# Patient Record
Sex: FEMALE | Race: WHITE | NOT HISPANIC OR LATINO | Employment: FULL TIME | URBAN - METROPOLITAN AREA
[De-identification: names, ages, dates, MRNs, and addresses within clinical notes are randomized per-mention and may not be internally consistent; named-entity substitution may affect disease eponyms.]

---

## 2017-01-30 ENCOUNTER — OFFICE VISIT (OUTPATIENT)
Dept: ORTHOPEDICS | Facility: CLINIC | Age: 42
End: 2017-01-30
Payer: COMMERCIAL

## 2017-01-30 ENCOUNTER — HOSPITAL ENCOUNTER (OUTPATIENT)
Dept: RADIOLOGY | Facility: HOSPITAL | Age: 42
Discharge: HOME | End: 2017-01-30
Attending: ORTHOPAEDIC SURGERY
Payer: COMMERCIAL

## 2017-01-30 VITALS
DIASTOLIC BLOOD PRESSURE: 86 MMHG | SYSTOLIC BLOOD PRESSURE: 161 MMHG | HEART RATE: 75 BPM | WEIGHT: 293 LBS | HEIGHT: 68 IN | BODY MASS INDEX: 44.41 KG/M2

## 2017-01-30 DIAGNOSIS — L03.116 CELLULITIS OF LEFT LOWER EXTREMITY: ICD-10-CM

## 2017-01-30 DIAGNOSIS — M25.569 KNEE PAIN, UNSPECIFIED CHRONICITY, UNSPECIFIED LATERALITY: ICD-10-CM

## 2017-01-30 DIAGNOSIS — M17.11 ARTHRITIS OF KNEE, RIGHT: Primary | ICD-10-CM

## 2017-01-30 DIAGNOSIS — M25.569 KNEE PAIN, UNSPECIFIED CHRONICITY, UNSPECIFIED LATERALITY: Primary | ICD-10-CM

## 2017-01-30 PROCEDURE — 99203 OFFICE O/P NEW LOW 30 MIN: CPT | Mod: 25,S$GLB,, | Performed by: ORTHOPAEDIC SURGERY

## 2017-01-30 PROCEDURE — 73564 X-RAY EXAM KNEE 4 OR MORE: CPT | Mod: TC,PN,RT

## 2017-01-30 PROCEDURE — 73562 X-RAY EXAM OF KNEE 3: CPT | Mod: 26,59,LT, | Performed by: RADIOLOGY

## 2017-01-30 PROCEDURE — 73564 X-RAY EXAM KNEE 4 OR MORE: CPT | Mod: 26,RT,, | Performed by: RADIOLOGY

## 2017-01-30 PROCEDURE — 20610 DRAIN/INJ JOINT/BURSA W/O US: CPT | Mod: RT,S$GLB,, | Performed by: ORTHOPAEDIC SURGERY

## 2017-01-30 PROCEDURE — 99999 PR PBB SHADOW E&M-EST. PATIENT-LVL III: CPT | Mod: PBBFAC,,, | Performed by: ORTHOPAEDIC SURGERY

## 2017-01-30 RX ORDER — TRIAMCINOLONE ACETONIDE 40 MG/ML
40 INJECTION, SUSPENSION INTRA-ARTICULAR; INTRAMUSCULAR
Status: DISCONTINUED | OUTPATIENT
Start: 2017-01-30 | End: 2017-01-30 | Stop reason: HOSPADM

## 2017-01-30 RX ORDER — IBUPROFEN 800 MG/1
TABLET ORAL
Qty: 60 TABLET | Refills: 1 | Status: SHIPPED | OUTPATIENT
Start: 2017-01-30 | End: 2017-03-29 | Stop reason: SDUPTHER

## 2017-01-30 RX ADMIN — TRIAMCINOLONE ACETONIDE 40 MG: 40 INJECTION, SUSPENSION INTRA-ARTICULAR; INTRAMUSCULAR at 02:01

## 2017-01-30 NOTE — PROGRESS NOTES
Past Medical History   Diagnosis Date    Bell's palsy      x4 attacks    Blood glucose abnormal     Dyslipidemia     Vitamin D deficiency disease        Past Surgical History   Procedure Laterality Date    Cholecystectomy       section, low transverse       x 2    Tubal ligation      Wound debridement       breast abscess x 3    Breast surgery Left 2008     abcess debrided on breast X 3 surgery       Current Outpatient Prescriptions   Medication Sig    albiglutide (TANZEUM) 30 mg/0.5 mL PnIj Inject 30 mg into the skin every 7 days.    ciclopirox (LOPROX) 0.77 % Crea Apply topically 2 (two) times daily.    ciclopirox (PENLAC) 8 % Soln Apply topically nightly.    ibuprofen (ADVIL,MOTRIN) 800 MG tablet TAKE 1 TABLET BY MOUTH 3 TIMES A DAY AS NEEDED FOR PAIN AND TAKE WITH FOOD    norethindrone-ethinyl estradiol (JUNEL FE , ,) 1 mg-20 mcg (21)/75 mg (7) per tablet Take 1 tablet by mouth once daily.    spironolactone (ALDACTONE) 50 MG tablet Take 1 tablet (50 mg total) by mouth once daily.    urea (UMECTA) 40 % Emul Apply 1 application topically 2 (two) times daily.    loratadine (CLARITIN) 10 mg tablet Take 1 tablet (10 mg total) by mouth once daily.     No current facility-administered medications for this visit.        Review of patient's allergies indicates:   Allergen Reactions    Codeine Nausea Only       Family History   Problem Relation Age of Onset    Asthma Father     Heart failure Father     Hypertension Mother     Hypertension Maternal Grandmother     Cancer Paternal Grandfather      lung    Rashes / Skin problems Son     Rashes / Skin problems Daughter     Glaucoma Neg Hx     Macular degeneration Neg Hx     Retinal detachment Neg Hx     Collagen disease Neg Hx        Social History     Social History    Marital status:      Spouse name: N/A    Number of children: 2    Years of education: N/A     Occupational History     [employer]      Social History Main Topics    Smoking status: Never Smoker    Smokeless tobacco: Never Used    Alcohol use Yes      Comment: 2 glasses of wine a month    Drug use: No    Sexual activity: Yes     Partners: Male     Birth control/ protection: Surgical     Other Topics Concern    Not on file     Social History Narrative       Chief Complaint:   Chief Complaint   Patient presents with    Knee Pain     right       History of present illness: 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  Patient's had pain now for about 3-4 months.  Her knee started locking up on her about a week ago.  Pain with sitting for prolonged periods of time.  Pain when walking or getting up from sitting.  Pain is located both medially and laterally and below the kneecap.  Patient denies swelling.  Pain as a 6 out of 10.  Symptoms are worsening and moderate to severe.  Taking some ibuprofen with minimal success.      Review of Systems:    Constitution: Negative for chills, fever, and sweats.  Negative for unexplained weight loss.    HENT:  Negative for headaches and blurry vision.    Cardiovascular:Negative for chest pain or irregular heart beat. Negative for hypertension.    Respiratory:  Negative for cough and shortness of breath.    Gastrointestinal: Negative for abdominal pain, heartburn, melena, nausea, and vomitting.    Genitourinary:  Negative bladder incontinence and dysuria.    Musculoskeletal:  See HPI    Neurological: Negative for numbness.    Psychiatric/Behavioral: Negative for depression.  The patient is not nervous/anxious.      Endocrine: Negative for polyuria    Hematologic/Lymphatic: Negative for bleeding problem.  Does  bruise/bleed easily.    Skin: Negative for poor would healing and rash      Physical Examination:    Vital Signs:    Vitals:    01/30/17 1329   BP: (!) 161/86   Pulse: 75       Body mass index is 52.15 kg/(m^2).    This a well-developed, well nourished patient  in no acute distress.  They are alert and oriented and cooperative to examination.  Pt. walks without an antalgic gait.      Examination of the right knee shows no rashes or erythema. There are no masses ecchymosis or effusion. Patient has full range of motion from 0-130°. Patient is mildly tender to palpation over lateral joint line and nontender to palpation over the medial joint line. Patient has a - Lachman exam, - anterior drawer exam, and - posterior drawer exam. - Emory's exam. Knee is stable to varus and valgus stress. 5 out of 5 motor strength. Palpable distal pulses. Intact light touch sensation. Negative Patellofemoral crepitus    Examination of the left knee shows no rashes or erythema. There are no masses ecchymosis or effusion. Patient has full range of motion from 0-130°. Patient is nontender to palpation over lateral joint line and nontender to palpation over the medial joint line. Patient has a - Lachman exam, - anterior drawer exam, and - posterior drawer exam. - Emory's exam. Knee is stable to varus and valgus stress. 5 out of 5 motor strength. Palpable distal pulses. Intact light touch sensation. Negative Patellofemoral crepitus    X-rays: X-rays of the right knee are ordered and reviewed which show some very mild arthritic changes.  Some spurring underneath the patella.  Mild calcification within the lateral meniscus.     Assessment:: Mild right knee arthritis    Plan:  I reviewed the findings and the x-rays with her  today.  We talked about exercise program and weight loss.  The patient cortisone injection today to help with some of the acute symptoms.  I gave her prescription for ibuprofen 800 milligrams 3 times a day.  Follow-up as needed.    This note was created using Dragon voice recognition software that occasionally misinterpreted phrases or words.    Consult note is delivered via Epic messaging service.

## 2017-01-30 NOTE — PROCEDURES
Large Joint Aspiration/Injection  Date/Time: 1/30/2017 2:13 PM  Performed by: KIM TYLER  Authorized by: KIM TYLER     Consent Done?:  Yes (Verbal)  Indications:  Pain  Procedure site marked: Yes    Timeout: Prior to procedure the correct patient, procedure, and site was verified      Location:  Knee  Site:  R knee  Prep: Patient was prepped and draped in usual sterile fashion    Needle size:  20 G  Approach:  Anterolateral  Medications:  40 mg triamcinolone acetonide 40 mg/mL  Patient tolerance:  Patient tolerated the procedure well with no immediate complications

## 2017-03-28 ENCOUNTER — LAB VISIT (OUTPATIENT)
Dept: LAB | Facility: HOSPITAL | Age: 42
End: 2017-03-28
Attending: FAMILY MEDICINE
Payer: COMMERCIAL

## 2017-03-28 DIAGNOSIS — R63.5 ABNORMAL WEIGHT GAIN: ICD-10-CM

## 2017-03-28 LAB
ALBUMIN SERPL BCP-MCNC: 3.3 G/DL
ALP SERPL-CCNC: 65 U/L
ALT SERPL W/O P-5'-P-CCNC: 31 U/L
ANION GAP SERPL CALC-SCNC: 7 MMOL/L
AST SERPL-CCNC: 24 U/L
BILIRUB SERPL-MCNC: 0.5 MG/DL
BUN SERPL-MCNC: 14 MG/DL
CALCIUM SERPL-MCNC: 9.1 MG/DL
CHLORIDE SERPL-SCNC: 104 MMOL/L
CO2 SERPL-SCNC: 30 MMOL/L
CREAT SERPL-MCNC: 0.8 MG/DL
EST. GFR  (AFRICAN AMERICAN): >60 ML/MIN/1.73 M^2
EST. GFR  (NON AFRICAN AMERICAN): >60 ML/MIN/1.73 M^2
GLUCOSE SERPL-MCNC: 81 MG/DL
POTASSIUM SERPL-SCNC: 4.1 MMOL/L
PROT SERPL-MCNC: 7.2 G/DL
SODIUM SERPL-SCNC: 141 MMOL/L

## 2017-03-28 PROCEDURE — 80053 COMPREHEN METABOLIC PANEL: CPT

## 2017-03-28 PROCEDURE — 36415 COLL VENOUS BLD VENIPUNCTURE: CPT | Mod: PO

## 2017-03-28 PROCEDURE — 83036 HEMOGLOBIN GLYCOSYLATED A1C: CPT

## 2017-03-28 RX ORDER — NORETHINDRONE ACETATE AND ETHINYL ESTRADIOL 1MG-20(21)
1 KIT ORAL DAILY
Qty: 28 TABLET | Refills: 0 | Status: SHIPPED | OUTPATIENT
Start: 2017-03-28 | End: 2017-04-21 | Stop reason: SDUPTHER

## 2017-03-29 DIAGNOSIS — L03.116 CELLULITIS OF LEFT LOWER EXTREMITY: ICD-10-CM

## 2017-03-29 LAB
ESTIMATED AVG GLUCOSE: 103 MG/DL
HBA1C MFR BLD HPLC: 5.2 %

## 2017-03-29 RX ORDER — IBUPROFEN 800 MG/1
TABLET ORAL
Qty: 60 TABLET | Refills: 1 | Status: SHIPPED | OUTPATIENT
Start: 2017-03-29 | End: 2017-04-05 | Stop reason: ALTCHOICE

## 2017-04-03 ENCOUNTER — TELEPHONE (OUTPATIENT)
Dept: FAMILY MEDICINE | Facility: CLINIC | Age: 42
End: 2017-04-03

## 2017-04-03 NOTE — TELEPHONE ENCOUNTER
Patient scheduled for 8:40am appt on Wednesday. I do not start clinic until 9am. Please reschedule patient.

## 2017-04-04 ENCOUNTER — DOCUMENTATION ONLY (OUTPATIENT)
Dept: FAMILY MEDICINE | Facility: CLINIC | Age: 42
End: 2017-04-04

## 2017-04-04 NOTE — PROGRESS NOTES
Pre-Visit Chart Review  For Appointment Scheduled on 4/5/17    Health Maintenance Due   Topic Date Due    TETANUS VACCINE  01/01/2017

## 2017-04-04 NOTE — PATIENT INSTRUCTIONS
Established High Blood Pressure    High blood pressure (hypertension) is a chronic disease. Often health care providers dont know what causes it. But it can be caused by certain health conditions and medicines.  If you have high blood pressure, you may not have any symptoms. If you do have symptoms, they may include headache, dizziness, changes in your vision, chest pain, and shortness of breath. But even without symptoms, high blood pressure thats not treated raises your risk for heart attack and stroke. High blood pressure is a serious health risk and shouldnt be ignored.  A blood pressure reading is made up of two numbers: a higher number over a lower number. The top number is the systolic pressure. The bottom number is the diastolic pressure. A normal blood pressure is less than 120 over less than 80.  High blood pressure is when either the top number is 140 or higher, or the bottom number is 90 or higher. This must be the result when taking your blood pressure a number of times. The blood pressures between normal and high are called prehypertension.  Home care  If you have high blood pressure, you should do what is listed below to lower your blood pressure. If you are taking medicines for high blood pressure, these methods may reduce or end your need for medicines in the future.  · Begin a weight-loss program if you are overweight.  · Cut back on how much salt you get in your diet. Heres how to do this:  ¨ Dont eat foods that have a lot of salt. These include olives, pickles, smoked meats, and salted potato chips.  ¨ Dont add salt to your food at the table.  ¨ Use only small amounts of salt when cooking.  · Begin an exercise program. Talk with your health care provider about the type of exercise program that would be best for you. It doesn't have to be hard. Even brisk walking for 20 minutes 3 times a week is a good form of exercise.  · Dont take medicines that have heart stimulants. This includes many  cold and sinus decongestant pills and sprays, as well as diet pills. Check the warnings about hypertension on the label. Stimulants such as amphetamine or cocaine could be lethal for someone with high blood pressure. Never take these.  · Limit how much caffeine you get in your diet. Switch to caffeine-free products.  · Stop smoking. If you are a long-time smoker, this can be hard. Enroll in a stop-smoking program to make it more likely that you will quit for good.  · Learn how to handle stress. This is an important part of any program to lower blood pressure. Learn about relaxation methods like meditation, yoga, or biofeedback.  · If your provider prescribed medicines, take them exactly as directed. Missing doses may cause your blood pressure get out of control.  · Consider buying an automatic blood pressure machine. You can get one of these at most pharmacies. Use this to watch your blood pressure at home. Give the results to your provider.  Follow-up care  You will need to make regular visits to your health care provider. This is to check your blood pressure and to make changes to your medicines. Make a follow-up appointment as directed.  When to seek medical advice  Call your health care provider right away if any of these occur:  · Chest pain or shortness of breath  · Severe headache  · Throbbing or rushing sound in the ears  · Nosebleed  · Sudden severe pain in your belly (abdomen)  · Extreme drowsiness, confusion, or fainting  · Dizziness or dizziness with a spinning sensation (vertigo)  · Weakness of an arm or leg or one side of the face  · You have problems speaking or seeing   Date Last Reviewed: 11/25/2014  © 3207-5794 [employer]. [address] [place] [address], [place] [address]. All rights reserved. This information is not intended as a substitute for professional medical care. Always follow your healthcare professional's instructions.          Walking for Fitness  Fitness walking has something  for everyone, even people who are already fit. Walking is one of the safest ways to condition your body aerobically. It can boost energy, help you lose weight, and reduce stress.    Physical benefits  · Walking strengthens your heart and lungs, and tones your muscles.  · When walking, your feet land with less impact than in other sports. This reduces chances of muscle, bone, and joint injury.  · Regular walking improves your cholesterol levels and lowers your risk of heart disease. And it helps you control your blood sugar if you have diabetes.  · Walking is a weight-bearing activity, which helps maintain bone density. This can help prevent osteoporosis.  Personal rewards  · Taking walks can help you relax and manage stress. And fitness walking may make you feel better about yourself.  · Walking can help you sleep better at night and make you less likely to be depressed.  · Regular walking may help maintain your memory as you get older.  · Walking is a great way to spend extra time with friends and family members. Be sure to invite your dog along!  Q&A about fitness walking  Q: Will walking keep me fit?  A: Yes. Regular walking at the right pace gives you all the benefits of other aerobic activities, such as jogging and swimming.  Q: Will walking help me lose weight and keep it off?  A: Yes. Per mile, walking can burn as many calories as jogging. Your health care provider can help work walking into your weight-loss plan.  Q: Is walking safe for my health?  A: Yes. Walking is safe if you have high blood pressure, diabetes, heart disease, or other conditions. Talk to your health care provider before you start.  Date Last Reviewed: 5/9/2015 © 2000-2016 [employer]. [address] [place] [address], [place] [address]. All rights reserved. This information is not intended as a substitute for professional medical care. Always follow your healthcare professional's instructions.            Weight Management: Getting  Started  Healthy bodies come in all shapes and sizes. Not all bodies are made to be thin. For some people, a healthy weight is higher than the average weight listed on weight charts. Your healthcare provider can help you decide on a healthy weight for you.    Reasons to lose weight  Losing weight can help with some health problems, such as high blood pressure, heart disease, diabetes, sleep apnea, and arthritis. You may also feel more energy.  Set your long-term goal  Your goal doesn't even have to be a specific weight. You may decide on a fitness goal (such as being able to walk 10 miles a week), or a health goal (such as lowering your blood pressure). Choose a goal that is measurable and reasonable, so you know when you've reached it. A goal of reaching a BMI of less than 25 is not always reasonable (or possible).   Make an action plan  Habits dont change overnight. Setting your goals too high can leave you feeling discouraged if you cant reach them. Be realistic. Choose one or two small changes you can make now. Set an action plan for how you are going to make these changes. When you can stick to this plan, keep making a few more small changes. Taking small steps will help you stay on the path to success.  Track your progress  Write down your goals. Then, keep a daily record of your progress. Write down what you eat and how active you are. This record lets you look back on how much youve done. It may also help when youre feeling frustrated. Reward yourself for success. Even if you dont reach every goal, give yourself credit for what you do get done.  Get support  Encouragement from others can help make losing weight easier. Ask your family members and friends for support. They may even want to join you. Also look to your healthcare provider, registered dietitian, and  for help. Your local hospital can give you more information about nutrition, exercise, and weight loss.  Date Last  Reviewed: 1/31/2016  © 5873-2750 [employer]. [address] [place] [address], [place] [address]. All rights reserved. This information is not intended as a substitute for professional medical care. Always follow your healthcare professional's instructions.

## 2017-04-05 ENCOUNTER — OFFICE VISIT (OUTPATIENT)
Dept: FAMILY MEDICINE | Facility: CLINIC | Age: 42
End: 2017-04-05
Payer: COMMERCIAL

## 2017-04-05 ENCOUNTER — LAB VISIT (OUTPATIENT)
Dept: LAB | Facility: HOSPITAL | Age: 42
End: 2017-04-05
Attending: FAMILY MEDICINE
Payer: COMMERCIAL

## 2017-04-05 VITALS
RESPIRATION RATE: 14 BRPM | WEIGHT: 293 LBS | BODY MASS INDEX: 44.41 KG/M2 | HEART RATE: 70 BPM | OXYGEN SATURATION: 99 % | DIASTOLIC BLOOD PRESSURE: 77 MMHG | SYSTOLIC BLOOD PRESSURE: 134 MMHG | HEIGHT: 68 IN

## 2017-04-05 DIAGNOSIS — I10 ESSENTIAL HYPERTENSION: ICD-10-CM

## 2017-04-05 DIAGNOSIS — R73.09 BLOOD GLUCOSE ABNORMAL: ICD-10-CM

## 2017-04-05 DIAGNOSIS — E55.9 VITAMIN D DEFICIENCY DISEASE: ICD-10-CM

## 2017-04-05 DIAGNOSIS — M25.50 ARTHRALGIA, UNSPECIFIED JOINT: ICD-10-CM

## 2017-04-05 DIAGNOSIS — Z23 NEED FOR TDAP VACCINATION: ICD-10-CM

## 2017-04-05 DIAGNOSIS — E28.2 POLYCYSTIC OVARIAN SYNDROME: Primary | Chronic | ICD-10-CM

## 2017-04-05 LAB — 25(OH)D3+25(OH)D2 SERPL-MCNC: 24 NG/ML

## 2017-04-05 PROCEDURE — 99999 PR PBB SHADOW E&M-EST. PATIENT-LVL III: CPT | Mod: PBBFAC,,, | Performed by: PHYSICIAN ASSISTANT

## 2017-04-05 PROCEDURE — 90471 IMMUNIZATION ADMIN: CPT | Mod: S$GLB,,, | Performed by: PHYSICIAN ASSISTANT

## 2017-04-05 PROCEDURE — 90715 TDAP VACCINE 7 YRS/> IM: CPT | Mod: S$GLB,,, | Performed by: PHYSICIAN ASSISTANT

## 2017-04-05 PROCEDURE — 82306 VITAMIN D 25 HYDROXY: CPT

## 2017-04-05 PROCEDURE — 36415 COLL VENOUS BLD VENIPUNCTURE: CPT | Mod: PO

## 2017-04-05 PROCEDURE — 99213 OFFICE O/P EST LOW 20 MIN: CPT | Mod: 25,S$GLB,, | Performed by: PHYSICIAN ASSISTANT

## 2017-04-05 RX ORDER — MELOXICAM 7.5 MG/1
7.5 TABLET ORAL DAILY
Qty: 30 TABLET | Refills: 3 | Status: SHIPPED | OUTPATIENT
Start: 2017-04-05 | End: 2017-08-15 | Stop reason: SDUPTHER

## 2017-04-05 NOTE — MR AVS SNAPSHOT
[facility]  [address]  [place] [address]  Phone: [phone]  Fax: [phone]                  Candie Krause   2017 11:20 AM   Office Visit    Description:  Female : 1975   Provider:  Hiral Monzon PA-C   Department:  [facility]           Reason for Visit     Follow-up           Diagnoses this Visit        Comments    Polycystic ovarian syndrome    -  Primary     Blood glucose abnormal         Essential hypertension         Arthralgia, unspecified joint         Need for Tdap vaccination         Vitamin D deficiency disease                To Do List           Future Appointments        Provider Department Dept Phone    2017 2:30 PM LAB, [facility] GARS[facility] - Lab [phone]    2017 11:00 AM Poncho Banks MD [facility] [phone]      Goals (5 Years of Data)     None       These Medications        Disp Refills Start End    albiglutide (TANZEUM) 30 mg/0.5 mL PnIj 4 each 3 2017     Inject 30 mg into the skin every 7 days. - Subcutaneous    Pharmacy: [facility]/pharmacy #[id number] - [place] - [address] Ph #: [phone]       meloxicam (MOBIC) 7.5 MG tablet 30 tablet 3 2017     Take 1 tablet (7.5 mg total) by mouth once daily. - Oral    Pharmacy: [facility]/pharmacy #[id number] - [place] - [address] Ph #: [phone]         [facility]     [facility] - 24/ Assistance  Unless otherwise directed by your provider, please contact [facility], our nurse care line that is available for 24/7 assistance.     Registered nurses in the [facility] provide: appointment scheduling, clinical advisement, health education, and other advisory services.  Call: [phone] (toll free)               Medications           Message regarding Medications     Verify the changes and/or additions to your medication regime listed below are the same as discussed with your clinician  "today.  If any of these changes or additions are incorrect, please notify your healthcare provider.        START taking these NEW medications        Refills    meloxicam (MOBIC) 7.5 MG tablet 3    Sig: Take 1 tablet (7.5 mg total) by mouth once daily.    Class: Normal    Route: Oral      STOP taking these medications     ibuprofen (ADVIL,MOTRIN) 800 MG tablet TAKE 1 TABLET BY MOUTH 3 TIMES A DAY AS NEEDED FOR PAIN WITH FOOD           Verify that the below list of medications is an accurate representation of the medications you are currently taking.  If none reported, the list may be blank. If incorrect, please contact your healthcare provider. Carry this list with you in case of emergency.           Current Medications     albiglutide (TANZEUM) 30 mg/0.5 mL PnIj Inject 30 mg into the skin every 7 days.    ciclopirox (LOPROX) 0.77 % Crea Apply topically 2 (two) times daily.    ciclopirox (PENLAC) 8 % Soln Apply topically nightly.    loratadine (CLARITIN) 10 mg tablet Take 1 tablet (10 mg total) by mouth once daily.    meloxicam (MOBIC) 7.5 MG tablet Take 1 tablet (7.5 mg total) by mouth once daily.    norethindrone-ethinyl estradiol (JUNEL FE 1/20, 28,) 1 mg-20 mcg (21)/75 mg (7) per tablet Take 1 tablet by mouth once daily.    spironolactone (ALDACTONE) 50 MG tablet Take 1 tablet (50 mg total) by mouth once daily.    urea (UMECTA) 40 % Emul Apply 1 application topically 2 (two) times daily.           Clinical Reference Information           Your Vitals Were     BP Pulse Resp Height Weight SpO2    134/77 (BP Location: Right arm, Patient Position: Sitting, BP Method: Automatic) 70 14 5' 8" (1.727 m) 159.2 kg (350 lb 15.6 oz) 99%    BMI                53.37 kg/m2          Blood Pressure          Most Recent Value    BP  134/77      Allergies as of 4/5/2017     Codeine      Immunizations Administered on Date of Encounter - 4/5/2017     Name Date Dose VIS Date Route    TDAP  Incomplete 0.5 mL 2/24/2015 Intramuscular    "   Orders Placed During Today's Visit      Normal Orders This Visit    Tdap Vaccine     Future Labs/Procedures Expected by Expires    Vitamin D  4/5/2017 4/6/2018      Instructions      Established High Blood Pressure    High blood pressure (hypertension) is a chronic disease. Often health care providers dont know what causes it. But it can be caused by certain health conditions and medicines.  If you have high blood pressure, you may not have any symptoms. If you do have symptoms, they may include headache, dizziness, changes in your vision, chest pain, and shortness of breath. But even without symptoms, high blood pressure thats not treated raises your risk for heart attack and stroke. High blood pressure is a serious health risk and shouldnt be ignored.  A blood pressure reading is made up of two numbers: a higher number over a lower number. The top number is the systolic pressure. The bottom number is the diastolic pressure. A normal blood pressure is less than 120 over less than 80.  High blood pressure is when either the top number is 140 or higher, or the bottom number is 90 or higher. This must be the result when taking your blood pressure a number of times. The blood pressures between normal and high are called prehypertension.  Home care  If you have high blood pressure, you should do what is listed below to lower your blood pressure. If you are taking medicines for high blood pressure, these methods may reduce or end your need for medicines in the future.  · Begin a weight-loss program if you are overweight.  · Cut back on how much salt you get in your diet. Heres how to do this:  ¨ Dont eat foods that have a lot of salt. These include olives, pickles, smoked meats, and salted potato chips.  ¨ Dont add salt to your food at the table.  ¨ Use only small amounts of salt when cooking.  · Begin an exercise program. Talk with your health care provider about the type of exercise program that would be best  for you. It doesn't have to be hard. Even brisk walking for 20 minutes 3 times a week is a good form of exercise.  · Dont take medicines that have heart stimulants. This includes many cold and sinus decongestant pills and sprays, as well as diet pills. Check the warnings about hypertension on the label. Stimulants such as amphetamine or cocaine could be lethal for someone with high blood pressure. Never take these.  · Limit how much caffeine you get in your diet. Switch to caffeine-free products.  · Stop smoking. If you are a long-time smoker, this can be hard. Enroll in a stop-smoking program to make it more likely that you will quit for good.  · Learn how to handle stress. This is an important part of any program to lower blood pressure. Learn about relaxation methods like meditation, yoga, or biofeedback.  · If your provider prescribed medicines, take them exactly as directed. Missing doses may cause your blood pressure get out of control.  · Consider buying an automatic blood pressure machine. You can get one of these at most pharmacies. Use this to watch your blood pressure at home. Give the results to your provider.  Follow-up care  You will need to make regular visits to your health care provider. This is to check your blood pressure and to make changes to your medicines. Make a follow-up appointment as directed.  When to seek medical advice  Call your health care provider right away if any of these occur:  · Chest pain or shortness of breath  · Severe headache  · Throbbing or rushing sound in the ears  · Nosebleed  · Sudden severe pain in your belly (abdomen)  · Extreme drowsiness, confusion, or fainting  · Dizziness or dizziness with a spinning sensation (vertigo)  · Weakness of an arm or leg or one side of the face  · You have problems speaking or seeing   Date Last Reviewed: 11/25/2014  © 0570-0240 [employer]. [address] [place] [address], [place] [address]. All rights reserved. This information  is not intended as a substitute for professional medical care. Always follow your healthcare professional's instructions.          Walking for Fitness  Fitness walking has something for everyone, even people who are already fit. Walking is one of the safest ways to condition your body aerobically. It can boost energy, help you lose weight, and reduce stress.    Physical benefits  · Walking strengthens your heart and lungs, and tones your muscles.  · When walking, your feet land with less impact than in other sports. This reduces chances of muscle, bone, and joint injury.  · Regular walking improves your cholesterol levels and lowers your risk of heart disease. And it helps you control your blood sugar if you have diabetes.  · Walking is a weight-bearing activity, which helps maintain bone density. This can help prevent osteoporosis.  Personal rewards  · Taking walks can help you relax and manage stress. And fitness walking may make you feel better about yourself.  · Walking can help you sleep better at night and make you less likely to be depressed.  · Regular walking may help maintain your memory as you get older.  · Walking is a great way to spend extra time with friends and family members. Be sure to invite your dog along!  Q&A about fitness walking  Q: Will walking keep me fit?  A: Yes. Regular walking at the right pace gives you all the benefits of other aerobic activities, such as jogging and swimming.  Q: Will walking help me lose weight and keep it off?  A: Yes. Per mile, walking can burn as many calories as jogging. Your health care provider can help work walking into your weight-loss plan.  Q: Is walking safe for my health?  A: Yes. Walking is safe if you have high blood pressure, diabetes, heart disease, or other conditions. Talk to your health care provider before you start.  Date Last Reviewed: 5/9/2015  © 1040-6095 [employer]. [address] [place] [address], [place] [address]. All rights  reserved. This information is not intended as a substitute for professional medical care. Always follow your healthcare professional's instructions.            Weight Management: Getting Started  Healthy bodies come in all shapes and sizes. Not all bodies are made to be thin. For some people, a healthy weight is higher than the average weight listed on weight charts. Your healthcare provider can help you decide on a healthy weight for you.    Reasons to lose weight  Losing weight can help with some health problems, such as high blood pressure, heart disease, diabetes, sleep apnea, and arthritis. You may also feel more energy.  Set your long-term goal  Your goal doesn't even have to be a specific weight. You may decide on a fitness goal (such as being able to walk 10 miles a week), or a health goal (such as lowering your blood pressure). Choose a goal that is measurable and reasonable, so you know when you've reached it. A goal of reaching a BMI of less than 25 is not always reasonable (or possible).   Make an action plan  Habits dont change overnight. Setting your goals too high can leave you feeling discouraged if you cant reach them. Be realistic. Choose one or two small changes you can make now. Set an action plan for how you are going to make these changes. When you can stick to this plan, keep making a few more small changes. Taking small steps will help you stay on the path to success.  Track your progress  Write down your goals. Then, keep a daily record of your progress. Write down what you eat and how active you are. This record lets you look back on how much youve done. It may also help when youre feeling frustrated. Reward yourself for success. Even if you dont reach every goal, give yourself credit for what you do get done.  Get support  Encouragement from others can help make losing weight easier. Ask your family members and friends for support. They may even want to join you. Also look to your  healthcare provider, registered dietitian, and  for help. Your local hospital can give you more information about nutrition, exercise, and weight loss.  Date Last Reviewed: 1/31/2016  © 1736-0620 [employer]. [address] [place] [address], [place] [address]. All rights reserved. This information is not intended as a substitute for professional medical care. Always follow your healthcare professional's instructions.                 Language Assistance Services     ATTENTION: Language assistance services are available, free of charge. Please call [phone].      ATENCIÓN: Si habla español, tiene a vu disposición servicios gratuitos de asistencia lingüística. Llame al [phone].     INA Ý: N?u b?n nói Ti?ng Vi?t, có các d?ch v? h? tr? ngôn ng? mi?n phí dành cho b?n. G?i s? [phone].         [facility] complies with applicable Federal civil rights laws and does not discriminate on the basis of race, color, national origin, age, disability, or sex.

## 2017-04-05 NOTE — PROGRESS NOTES
Patient identified using name and . VIS given to patient and procedure was explained. Patient verbalized understanding. TDAP administered IM in the right deltoid using sterile technique. No residual bleeding noted. Patient tolerated procedure well.

## 2017-04-05 NOTE — PROGRESS NOTES
Subjective:       Patient ID: Candie Krause is a 41 y.o. female.    Chief Complaint: Follow-up (F/U medication)    HPI Comments: Ms. Krause comes to clinic today for follow up. She requests medication refills. She reports that the tanzeum was originally refilled under a diagnosis that is not paid for. She also requests a refill of mobic. The patient recently had blood work that was stable. She is due to have her vitamin D level checked at this time. She is also due for a TDAP vaccine. The patient has no complaints today and reports that overall she is doing well.     Review of Systems   Constitutional: Negative for activity change, appetite change and fever.   HENT: Negative for postnasal drip, rhinorrhea and sinus pressure.    Eyes: Negative for visual disturbance.   Respiratory: Negative for cough and shortness of breath.    Cardiovascular: Negative for chest pain.   Gastrointestinal: Negative for abdominal distention and abdominal pain.   Genitourinary: Negative for difficulty urinating and dysuria.   Musculoskeletal: Positive for arthralgias. Negative for myalgias.   Neurological: Negative for headaches.   Hematological: Negative for adenopathy.   Psychiatric/Behavioral: The patient is not nervous/anxious.        Objective:      Physical Exam   Constitutional: She is oriented to person, place, and time.   Cardiovascular: Normal rate and regular rhythm.    Pulmonary/Chest: Effort normal and breath sounds normal. She has no wheezes.   Abdominal: Soft. Bowel sounds are normal. There is no tenderness.   Musculoskeletal: She exhibits no edema.   Neurological: She is alert and oriented to person, place, and time.   Skin: No erythema.   Psychiatric: Her behavior is normal.       Assessment:       1. Polycystic ovarian syndrome    2. Blood glucose abnormal    3. Essential hypertension    4. Arthralgia, unspecified joint    5. Need for Tdap vaccination    6. Vitamin D deficiency disease        Plan:       Candie  was seen today for follow-up.    Diagnoses and all orders for this visit:    Polycystic ovarian syndrome  -     albiglutide (TANZEUM) 30 mg/0.5 mL PnIj; Inject 30 mg into the skin every 7 days.    Blood glucose abnormal  -     albiglutide (TANZEUM) 30 mg/0.5 mL PnIj; Inject 30 mg into the skin every 7 days.    Essential hypertension  Controlled  Low salt diet  Continue current medication  Arthralgia, unspecified joint  -     meloxicam (MOBIC) 7.5 MG tablet; Take 1 tablet (7.5 mg total) by mouth once daily.  Patient advised to take medication with food and only take as needed as long term use of NSAID can lead to GI bleeding. Patient verbalized understanding.  Need for Tdap vaccination  -     Tdap Vaccine    Vitamin D deficiency disease  -     Vitamin D; Future

## 2017-04-06 DIAGNOSIS — E55.9 VITAMIN D DEFICIENCY: Primary | ICD-10-CM

## 2017-04-06 RX ORDER — ERGOCALCIFEROL 1.25 MG/1
50000 CAPSULE ORAL WEEKLY
Qty: 4 CAPSULE | Refills: 5 | Status: SHIPPED | OUTPATIENT
Start: 2017-04-06 | End: 2017-07-21 | Stop reason: SDUPTHER

## 2017-04-10 ENCOUNTER — PATIENT MESSAGE (OUTPATIENT)
Dept: FAMILY MEDICINE | Facility: CLINIC | Age: 42
End: 2017-04-10

## 2017-04-10 DIAGNOSIS — R73.9 HYPERGLYCEMIA: Primary | ICD-10-CM

## 2017-04-10 DIAGNOSIS — E28.2 PCOS (POLYCYSTIC OVARIAN SYNDROME): ICD-10-CM

## 2017-04-21 RX ORDER — NORETHINDRONE ACETATE AND ETHINYL ESTRADIOL 1MG-20(21)
KIT ORAL
Qty: 28 TABLET | Refills: 0 | Status: SHIPPED | OUTPATIENT
Start: 2017-04-21 | End: 2017-08-18 | Stop reason: SDUPTHER

## 2017-06-09 ENCOUNTER — TELEPHONE (OUTPATIENT)
Dept: OPTOMETRY | Facility: CLINIC | Age: 42
End: 2017-06-09

## 2017-06-09 NOTE — TELEPHONE ENCOUNTER
Recvd request to order contacts. Called and left mesg with pt that the contact lens rx we have on file is from  and . Advised we need new rx or set up an appt for routine eye exam with contact lens fitting.    Saleem Diaz, COA  [facility]

## 2017-07-20 ENCOUNTER — DOCUMENTATION ONLY (OUTPATIENT)
Dept: FAMILY MEDICINE | Facility: CLINIC | Age: 42
End: 2017-07-20

## 2017-07-20 NOTE — PROGRESS NOTES
Pre-Visit Chart Review  For Appointment Scheduled on 07/21/2017    Health Maintenance Due   Topic Date Due    Mammogram  08/26/2017

## 2017-07-21 ENCOUNTER — OFFICE VISIT (OUTPATIENT)
Dept: FAMILY MEDICINE | Facility: CLINIC | Age: 42
End: 2017-07-21
Payer: COMMERCIAL

## 2017-07-21 ENCOUNTER — HOSPITAL ENCOUNTER (OUTPATIENT)
Dept: RADIOLOGY | Facility: CLINIC | Age: 42
Discharge: HOME | End: 2017-07-21
Attending: FAMILY MEDICINE
Payer: COMMERCIAL

## 2017-07-21 VITALS
DIASTOLIC BLOOD PRESSURE: 60 MMHG | HEART RATE: 66 BPM | BODY MASS INDEX: 43.4 KG/M2 | TEMPERATURE: 98 F | WEIGHT: 293 LBS | HEIGHT: 69 IN | SYSTOLIC BLOOD PRESSURE: 132 MMHG

## 2017-07-21 DIAGNOSIS — G56.01 CARPAL TUNNEL SYNDROME OF RIGHT WRIST: Chronic | ICD-10-CM

## 2017-07-21 DIAGNOSIS — M47.22 OSTEOARTHRITIS OF SPINE WITH RADICULOPATHY, CERVICAL REGION: ICD-10-CM

## 2017-07-21 DIAGNOSIS — I10 ESSENTIAL HYPERTENSION: Primary | ICD-10-CM

## 2017-07-21 DIAGNOSIS — E55.9 VITAMIN D DEFICIENCY: ICD-10-CM

## 2017-07-21 PROCEDURE — 72040 X-RAY EXAM NECK SPINE 2-3 VW: CPT | Mod: TC,PO

## 2017-07-21 PROCEDURE — 99999 PR PBB SHADOW E&M-EST. PATIENT-LVL III: CPT | Mod: PBBFAC,,, | Performed by: FAMILY MEDICINE

## 2017-07-21 PROCEDURE — 99213 OFFICE O/P EST LOW 20 MIN: CPT | Mod: S$GLB,,, | Performed by: FAMILY MEDICINE

## 2017-07-21 PROCEDURE — 72040 X-RAY EXAM NECK SPINE 2-3 VW: CPT | Mod: 26,,, | Performed by: RADIOLOGY

## 2017-07-21 RX ORDER — GABAPENTIN 300 MG/1
300 CAPSULE ORAL NIGHTLY
Qty: 30 CAPSULE | Refills: 3 | Status: SHIPPED | OUTPATIENT
Start: 2017-07-21 | End: 2017-12-27 | Stop reason: SDUPTHER

## 2017-07-21 RX ORDER — ERGOCALCIFEROL 1.25 MG/1
50000 CAPSULE ORAL
Qty: 8 CAPSULE | Refills: 5 | Status: SHIPPED | OUTPATIENT
Start: 2017-07-24 | End: 2018-02-17 | Stop reason: SDUPTHER

## 2017-07-21 NOTE — PATIENT INSTRUCTIONS
Back Basics: A Healthy Spine  A healthy spine supports the body while letting it move freely. It does this with the help of three natural curves. Strong, flexible muscles help, too. They support the spine by keeping its curves properly aligned. The disks that cushion the bones of your spine also play a role in back fitness.    Three natural curves  The spine is made of bones (vertebrae) and pads of soft tissue (disks). These parts are arranged in three curves: cervical, thoracic, and lumbar. When properly aligned, these curves keep your body balanced. They also support your body when you move. By distributing your weight throughout your spine, the curves make back injuries less likely.  Strong, flexible muscles  Strong, flexible back muscles help support the three curves of the spine. They do so by holding the vertebrae and disks in proper alignment. Strong, flexible abdominal, hip, and leg muscles also reduce strain on the back.  The lumbar curve  The lumbar curve is the hardest-working part of the spine. It carries more weight and moves the most. Aligning this curve helps prevent damage to vertebrae, disks, and other parts of the spine.  Cushioning disks  Disks are the soft pads of tissue between the vertebrae. The disks absorb shock caused by movement. Each disk has a spongy center (nucleus) and a tougher outer ring (annulus). Movement within the nucleus allows the vertebrae to rock back and forth on the disks. This provides the flexibility needed to bend and move.       Date Last Reviewed: 10/18/2015  © 1300-1134 [employer]. [address] [place] [address], [place] [address]. All rights reserved. This information is not intended as a substitute for professional medical care. Always follow your healthcare professional's instructions.        Back Care Tips    Caring for your back  These are things you can do to prevent a recurrence of acute back pain and to reduce symptoms from chronic back  pain:  · Maintain a healthy weight. If you are overweight, losing weight will help most types of back pain.  · Exercise is an important part of recovery from most types of back pain. The muscles behind and in front of the spine support the back. This means strengthening both the back muscles and the abdominal muscles will provide better support for your spine.   · Swimming and brisk walking are good overall exercises to improve your fitness level.  · Practice safe lifting methods (below).  · Practice good posture when sitting, standing and walking. Avoid prolonged sitting. This puts more stress on the lower back than standing or walking.  · Wear quality shoes with sufficient arch support. Foot and ankle alignment can affect back symptoms. Women should avoid wearing high heels.  · Therapeutic massage can help relax the back muscles without stretching them.  · During the first 24 to 72 hours after an acute injury or flare-up of chronic back pain, apply an ice pack to the painful area for 20 minutes and then remove it for 20 minutes, over a period of 60 to 90 minutes, or several times a day. As a safety precaution, do not use a heating pad at bedtime. Sleeping on a heating pad can lead to skin burns or tissue damage.  · You can alternate ice and heat therapies.  Medications  Talk to your healthcare provider before using medicines, especially if you have other medical problems or are taking other medicines.  · You may use acetaminophen or ibuprofen to control pain, unless your healthcare provider prescribed other pain medicine. If you have chronic conditions like diabetes, liver or kidney disease, stomach ulcers, or gastrointestinal bleeding, or are taking blood thinners, talk with your healthcare provider before taking any medicines.  · Be careful if you are given prescription pain medicines, narcotics, or medicine for muscle spasm. They can cause drowsiness, affect your coordination, reflexes, and judgment. Do not  drive or operate heavy machinery while taking these types of medicines. Take prescription pain medicine only as prescribed by your healthcare provider.  Lumbar stretch  Here is a simple stretching exercise that will help relax muscle spasm and keep your back more limber. If exercise makes your back pain worse, dont do it.  · Lie on your back with your knees bent and both feet on the ground.  · Slowly raise your left knee to your chest as you flatten your lower back against the floor. Hold for 5 seconds.  · Relax and repeat the exercise with your right knee.  · Do 10 of these exercises for each leg.  Safe lifting method  · Dont bend over at the waist to lift an object off the floor.  Instead, bend your knees and hips in a squat.   · Keep your back and head upright  · Hold the object close to your body, directly in front of you.  · Straighten your legs to lift the object.   · Lower the object to the floor in the reverse fashion.  · If you must slide something across the floor, push it.  Posture tips  Sitting  Sit in chairs with straight backs or low-back support. Keep your knees lower than your hips, with your feet flat on the floor.  When driving, sit up straight. Adjust the seat forward so you are not leaning toward the steering wheel.  A small pillow or rolled towel behind your lower back may help if you are driving long distances.   Standing  When standing for long periods, shift most of your weight to one leg at a time. Alternate legs every few minutes.   Sleeping  The best way to sleep is on your side with your knees bent. Put a low pillow under your head to support your neck in a neutral spine position. Avoid thick pillows that bend your neck to one side. Put a pillow between your legs to further relax your lower back. If you sleep on your back, put pillows under your knees to support your legs in a slightly flexed position. Use a firm mattress. If your mattress sags, replace it, or use a 1/2-inch plywood  board under the mattress to add support.  Follow-up care  Follow up with your healthcare provider, or as advised.  If X-rays, a CT scan or an MRI scan were taken, they will be reviewed by a radiologist. You will be notified of any new findings that may affect your care.  Call 911  Seek emergency medical care if any of the following occur:  · Trouble breathing  · Confusion  · Very drowsy  · Fainting or loss of consciousness  · Rapid or very slow heart rate  · Loss of  bowel or bladder control  When to seek medical care  Call your healthcare provider if any of the following occur:  · Pain becomes worse or spreads to your arms or legs  · Weakness or numbness in one or both arms or legs  · Numbness in the groin area  Date Last Reviewed: 6/1/2016 © 2000-2016 [facility]. [address] [place] [address]. All rights reserved. This information is not intended as a substitute for professional medical care. Always follow your healthcare professional's instructions.        Back Basics: A Healthy Spine  A healthy spine supports the body while letting it move freely. It does this with the help of three natural curves. Strong, flexible muscles help, too. They support the spine by keeping its curves properly aligned. The disks that cushion the bones of your spine also play a role in back fitness.    Three natural curves  The spine is made of bones (vertebrae) and pads of soft tissue (disks). These parts are arranged in three curves: cervical, thoracic, and lumbar. When properly aligned, these curves keep your body balanced. They also support your body when you move. By distributing your weight throughout your spine, the curves make back injuries less likely.  Strong, flexible muscles  Strong, flexible back muscles help support the three curves of the spine. They do so by holding the vertebrae and disks in proper alignment. Strong, flexible abdominal, hip, and leg muscles also reduce strain on the  back.  The lumbar curve  The lumbar curve is the hardest-working part of the spine. It carries more weight and moves the most. Aligning this curve helps prevent damage to vertebrae, disks, and other parts of the spine.  Cushioning disks  Disks are the soft pads of tissue between the vertebrae. The disks absorb shock caused by movement. Each disk has a spongy center (nucleus) and a tougher outer ring (annulus). Movement within the nucleus allows the vertebrae to rock back and forth on the disks. This provides the flexibility needed to bend and move.       Date Last Reviewed: 10/18/2015  © 7440-0220 [employer]. [address] [place] [address], [place] [address]. All rights reserved. This information is not intended as a substitute for professional medical care. Always follow your healthcare professional's instructions.        Exercise for a Healthier Heart  You may wonder how you can improve the health of your heart. If youre thinking about exercise, youre on the right track. You dont need to become an athlete, but you do need a certain amount of brisk exercise to help strengthen your heart. If you have been diagnosed with a heart condition, your doctor may recommend exercise to help stabilize your condition. To help make exercise a habit, choose safe, fun activities.     Exercise with a friend. When activity is fun, you're more likely to stick with it.     Be sure to check with your healthcare provider before starting an exercise program.   Why exercise?  Exercising regularly offers many healthy rewards. It can help you do all of the following:  · Improve your blood cholesterol level to help prevent further heart trouble  · Lower your blood pressure to help prevent a stroke or heart attack  · Control diabetes, or reduce your risk of getting this disease  · Improve your heart and lung function  · Reach and maintain a healthy weight  · Make your muscles stronger and more limber so you can stay  active  · Prevent falls and fractures by slowing the loss of bone mass (osteoporosis)  · Manage stress better  · Reduce your blood pressure  · Improve your sense of self and your body image  Exercise tips  Ease into your routine. Set small goals. Then build on them.  Exercise on most days. Aim for a total of 150 or more minutes of moderate to  vigorous intensity activity each week. Consider 40 minutes, 3 to 4 times a week. For best results, activity should last for 40 minutes on average. It is OK to work up to the 40 minute period over time. Examples of moderate-intensity activity is walking 1 mile in 15 minutes or 30 to 45 minutes of yard work.  Step up your daily activity level. Along with your exercise program, try being more active throughout the day. Walk instead of drive. Do more household tasks or yard work.  Choose one or more activities you enjoy. Walking is one of the easiest things you can do. You can also try swimming, riding a bike, dancing, or taking an exercise class.  Stop exercising and call your doctor if you:  · Have chest pain or feel dizzy or lightheaded  · Feel burning, tightness, pressure, or heaviness in your chest, neck, shoulders, back, or arms  · Have unusual shortness of breath  · Have increased joint or muscle pain  · Have palpitations or an irregular heartbeat   Date Last Reviewed: 5/1/2016  © 2853-6067 [employer]. [address] [place] [address]. All rights reserved. This information is not intended as a substitute for professional medical care. Always follow your healthcare professional's instructions.        Carpal Tunnel Syndrome    Carpal tunnel syndrome is a painful condition of the wrist and arm. It is caused by pressure on the median nerve.  The median nerve is one of the nerves that give feeling and movement to the hand. It passes through a tunnel in the wrist called the carpal tunnel. This tunnel is made up of bones and ligaments. Narrowing of this  tunnel or swelling of the tissues inside the tunnel puts pressure on the median nerve. This causes numbness, pins and needles, or electric shooting pains in your hand and forearm. Often the pain is worse at night and may wake you when you are asleep.  Carpal tunnel syndrome may occur during pregnancy and with use of birth control pills. It is more common in workers who must often bend their wrists. It is also common in people who work with power tools that cause strong vibrations.  Home care  · Rest the painful wrist. Avoid repeated bending of the wrist back and forth. This puts pressure on the median nerve. Avoid using power tools with strong vibrations.  · If you were given a splint, wear it at night while you sleep. You may also wear it during the day for comfort.  · Move your fingers and wrists often to avoid stiffness.  · Elevate your arms on pillows when you lie down.  · Try using the unaffected hand more.  · Try not to hold your wrists in a bent, downward position.  · Sometimes changes in the work place may ease symptoms. If you type most of the day, it may help to change the position of your keyboard or add a wrist support. Your wrist should be in a neutral position and not bent back when typing.  · You may use over-the-counter pain medicine to treat pain and inflammation, unless another medicine was prescribed. Anti-inflammatory pain medicines, such as ibuprofen or naproxen may be more effective than acetaminophen, which treats pain, but not inflammation. If you have chronic liver or kidney disease or ever had a stomach ulcer or GI bleeding, talk with your doctor before using these medicines.  · Opioid pain medicine will only give temporary relief and does not treat the problem. If pain continues, you may need a shot of a steroid drug into your wrist.  · If the above methods fail, you may need surgery. This will open the carpal tunnel and release the pressure on the trapped nerve.  Follow-up care  Follow up  with your healthcare provider, or as advised, if the pain doesnt begin to improve within the next week.  If X-rays were taken, you will be notified of any new findings that may affect your care.  When to seek medical advice  Call your healthcare provider right away if any of these occur:  · Pain not improving with the above treatment  · Fingers or hand become cold, blue, numb, or tingly  · Your whole arm becomes swollen or weak  Date Last Reviewed: 11/23/2015 © 2000-2016 [employer]. [address] [place] [address]. All rights reserved. This information is not intended as a substitute for professional medical care. Always follow your healthcare professional's instructions.        Carpal Tunnel Syndrome Prevention Tips  Some repetitive hand activities put you at higher risk for carpal tunnel syndrome (CTS). But you can reduce your risk. Learn how to change the way you use your hands. Below are tips for at home and on the job. Be sure to also follow the hand and wrist safety policies at your workplace.      Keep your wrist in a neutral (straight) position when exercising.      Keep your wrist in neutral  Keep a neutral (straight) wrist position as often as you can. Dont use your wrist in a bent (flexed) position for long periods of time. This includes extended or twisted positions.  Watch your   Dont just use your thumb and index finger to grasp or lift. This can put stress on your wrist. When you can, use your whole hand and all its fingers to grasp an object.  Minimize repetition  Dont move your arms or hands or hold an object in the same way for long periods of time. Even simple, light tasks can cause injury this way. Instead, alternate tasks or switch hands.  Rest your hands  Give your hands a break from time to time with a rest. Even a few minutes once an hour can help.  Reduce speed and force  Slow down the speed in which you do a forceful, repetitive motion. This gives your  wrist time to recover from the effort. Use power tools to help reduce the force.  Strengthen the muscles  Weak muscles may lead to a poor wrist or arm position. Exercises will make your hand and arm muscles stronger. This can help you keep a better position.  Date Last Reviewed: 9/11/2015  © 4730-1157 [employer]. [address] [place] [address]. All rights reserved. This information is not intended as a substitute for professional medical care. Always follow your healthcare professional's instructions.        Understanding Carpal Tunnel Syndrome    The carpal tunnel is a narrow space inside the wrist. It is ringed by bone and a band of tough tissue called the transverse carpal ligament. A major nerve called the median nerve runs from the forearm into the hand through the carpal tunnel. Tendons also run through the carpal tunnel.  With carpal tunnel syndrome, the tendons or nearby tissues within the carpal tunnel may swell or thicken. Or the transverse carpal ligament may harden and shorten. This narrows the space in the carpal tunnel and puts pressure on the median nerve. This pressure leads to tingling and numbness of the hand and wrist. In time, the condition can make even simple tasks hard to do.  What causes carpal tunnel syndrome?  Doctors arent entirely clear why the condition occurs. Certain things may make a person more likely to have it. These include:  · Being female  · Being pregnant  · Being overweight  · Having diabetes or rheumatoid arthritis  Symptoms of carpal tunnel syndrome  Symptoms often come and go. At first, symptoms may occur mainly at night. Later, they may be noticed during the day as well. They may get worse with activities such as driving, reading, typing, or holding a phone. Symptoms can include:  · Tingling and numbness in the hand or wrist  · Sharp pain that shoots up the arm or down to the fingers  · Hand stiffness or cramping, especially in the  morning  · Trouble making a fist  · Hand weakness and clumsiness  Treatment for carpal tunnel syndrome  Certain treatments help reduce the pressure on the median nerve and relieve symptoms. Choices for treatment may include one or more of the following:  · Wrist splint. This involves wearing a special brace on the wrist and hand. The splint holds the wrist straight, in a neutral position. This helps keep the carpal tunnel as open as possible.  · Cortisone shots. Cortisone is a medicine that helps reduce swelling. It is injected directly into the wrist. It helps shrink tissues inside the carpal tunnel. This relieves symptoms for a time.  · Pain medicines. You may take over-the-counter or prescription medicines to help reduce swelling and relieve symptoms.  · Surgery. If the condition doesnt respond to other treatments and doesnt go away on its own, you may need surgery. During surgery, the surgeon cuts the transverse carpal ligament to relieve pressure on the median nerve.     When to call your healthcare provider  Call your healthcare provider right away if you have any of these:  · Fever of 100.4°F (38°C) or higher, or as directed  · Symptoms that dont get better, or get worse  · New symptoms   Date Last Reviewed: 3/10/2016  © 8127-9463 [employer]. [address] [place] [address], [place] [address]. All rights reserved. This information is not intended as a substitute for professional medical care. Always follow your healthcare professional's instructions.

## 2017-07-23 PROBLEM — G56.01 CARPAL TUNNEL SYNDROME OF RIGHT WRIST: Chronic | Status: ACTIVE | Noted: 2017-07-23

## 2017-07-23 NOTE — PROGRESS NOTES
Subjective:       Patient ID: Candie Krause is a 41 y.o. female.    Chief Complaint: Hypertension and neuropathy in right arm    Patient Active Problem List:     Vitamin D deficiency disease     Dyslipidemia     Blood glucose abnormal     Hirsutism     Obesity, Class III, BMI 40-49.9 (morbid obesity)     Irregular menses     Polycystic ovarian syndrome     Essential hypertension    She has HTN well controlled. POC's with difficult loosing weight, She has this rt hand tongling, suggestive CTS but left neck tingling at neck extension and raising her left arm, difficult to keep up while driving.      Neck Pain    This is a chronic problem. The current episode started 1 to 4 weeks ago. The problem has been waxing and waning. The pain is associated with a sleep position. The pain is present in the left side. The quality of the pain is described as aching. The pain is at a severity of 7/10. The pain is moderate. The symptoms are aggravated by twisting. Associated symptoms include numbness, paresis and tingling. Pertinent negatives include no chest pain, fever, headaches, leg pain, pain with swallowing, photophobia, trouble swallowing or weight loss. She has tried NSAIDs for the symptoms. The treatment provided mild relief.     Review of Systems   Constitutional: Negative.  Negative for activity change, appetite change, chills, diaphoresis, fever and weight loss.   HENT: Negative.  Negative for facial swelling, hearing loss, nosebleeds, postnasal drip, sneezing and trouble swallowing.    Eyes: Negative for photophobia, pain, discharge, redness, itching and visual disturbance.   Respiratory: Negative for apnea, cough, chest tightness, shortness of breath and wheezing.    Cardiovascular: Negative.  Negative for chest pain, palpitations and leg swelling.   Gastrointestinal: Negative.  Negative for abdominal distention, abdominal pain, anal bleeding, blood in stool and diarrhea.   Endocrine: Negative.  Negative for cold  intolerance, heat intolerance, polydipsia, polyphagia and polyuria.   Genitourinary: Negative.  Negative for difficulty urinating, dysuria, frequency, genital sores, hematuria, pelvic pain, urgency, vaginal bleeding and vaginal discharge.   Musculoskeletal: Positive for arthralgias, myalgias and neck pain. Negative for back pain, gait problem and neck stiffness.   Skin: Negative.  Negative for color change, pallor and rash.   Allergic/Immunologic: Negative.  Negative for environmental allergies and food allergies.   Neurological: Positive for tingling and numbness. Negative for dizziness, tremors, seizures, syncope, speech difficulty and headaches.   Hematological: Negative for adenopathy.   Psychiatric/Behavioral: Negative for agitation, behavioral problems, confusion, decreased concentration, hallucinations, self-injury and sleep disturbance. The patient is not nervous/anxious and is not hyperactive.        Objective:      Physical Exam   Constitutional: She is oriented to person, place, and time. She appears well-developed and well-nourished.   HENT:   Head: Normocephalic and atraumatic.   Right Ear: External ear normal.   Left Ear: External ear normal.   Nose: Nose normal.   Mouth/Throat: No oropharyngeal exudate.   Eyes: Conjunctivae and EOM are normal. Pupils are equal, round, and reactive to light. Right eye exhibits no discharge. Left eye exhibits no discharge. No scleral icterus.   Neck: Normal range of motion. Neck supple. Normal carotid pulses and no JVD present. Spinous process tenderness present. Carotid bruit is not present. No tracheal deviation present. No thyromegaly present.   Neck exam - supple, no significant adenopathy, bilateral symmetric anterior adenopathy, carotid bruit noted, thyroid exam: thyroid is normal in size without nodules or tenderness, no nodules felt, normal DTR's, no tremor noted, no clonus noted.  Adam's positive at her left side.   Cardiovascular: Normal rate, normal heart  sounds and intact distal pulses.  Exam reveals no gallop and no friction rub.    No murmur heard.  Pulmonary/Chest: Effort normal. No stridor. No respiratory distress. She has no wheezes. She has no rales. She exhibits no tenderness.   Abdominal: Soft. Bowel sounds are normal. She exhibits no distension and no mass. There is no tenderness. There is no rebound and no guarding.   Musculoskeletal: Normal range of motion. She exhibits no edema.   Lymphadenopathy:     She has no cervical adenopathy.   Neurological: She is alert and oriented to person, place, and time. She displays normal reflexes. No cranial nerve deficit. She exhibits normal muscle tone. Coordination normal.   Reflex Scores:       Tricep reflexes are 3+ on the right side and 2+ on the left side.       Bicep reflexes are 3+ on the right side and 3+ on the left side.       Brachioradialis reflexes are 3+ on the right side and 3+ on the left side.  RT Hand Tinel's and Phalen positive..    Skin: Skin is dry. No rash noted. She is not diaphoretic. No erythema. No pallor.   Psychiatric: She has a normal mood and affect. Her behavior is normal. Judgment and thought content normal.   Vitals reviewed.        Chemistry        Component Value Date/Time     03/28/2017 1216    K 4.1 03/28/2017 1216     03/28/2017 1216    CO2 30 (H) 03/28/2017 1216    BUN 14 03/28/2017 1216    CREATININE 0.8 03/28/2017 1216    GLU 81 03/28/2017 1216        Component Value Date/Time    CALCIUM 9.1 03/28/2017 1216    ALKPHOS 65 03/28/2017 1216    AST 24 03/28/2017 1216    ALT 31 03/28/2017 1216    BILITOT 0.5 03/28/2017 1216    ESTGFRAFRICA >60.0 03/28/2017 1216    EGFRNONAA >60.0 03/28/2017 1216       X Rays: C4-C5 DJD changes.Patient notified by online.    Assessment:       1. Essential hypertension    2. Osteoarthritis of spine with radiculopathy, cervical region    3. Vitamin D deficiency        Plan:       Essential hypertension    Osteoarthritis of spine with  radiculopathy, cervical region  -     X-Ray Cervical Spine AP And Lateral; Future; Expected date: 07/21/2017  -     Ambulatory Referral to Physical/Occupational Therapy  -     gabapentin (NEURONTIN) 300 MG capsule; Take 1 capsule (300 mg total) by mouth every evening.  Dispense: 30 capsule; Refill: 3    Vitamin D deficiency  -     ergocalciferol (ERGOCALCIFEROL) 50,000 unit Cap; Take 1 capsule (50,000 Units total) by mouth twice a week.  Dispense: 8 capsule; Refill: 5  -     Vitamin D; Future; Expected date: 07/21/2017  -     Basic metabolic panel; Future; Expected date: 07/21/2017      Patient readiness: acceptance and barriers:none    During the course of the visit the patient was educated and counseled about the following:     Obesity:   General weight loss/lifestyle modification strategies discussed (elicit support from others; identify saboteurs; non-food rewards, etc).    Goals: Obesity: Reduce calorie intake and BMI    Did patient meet goals/outcomes: Yes    The following self management tools provided: excercise log    Patient Instructions (the written plan) was given to the patient/family.     Time spent with patient: 30 minutes

## 2017-07-31 ENCOUNTER — CLINICAL SUPPORT (OUTPATIENT)
Dept: REHABILITATION | Facility: HOSPITAL | Age: 42
End: 2017-07-31
Attending: FAMILY MEDICINE
Payer: COMMERCIAL

## 2017-07-31 DIAGNOSIS — M47.22 OSTEOARTHRITIS OF SPINE WITH RADICULOPATHY, CERVICAL REGION: ICD-10-CM

## 2017-07-31 PROCEDURE — 97161 PT EVAL LOW COMPLEX 20 MIN: CPT | Mod: PN

## 2017-07-31 NOTE — PLAN OF CARE
TIME RECORD    2017    Start Time:    Stop Time:  1700    PROCEDURES:    TIMED  Procedure Time Min.    Start:  Stop:     Start:  Stop:     Start:  Stop:     Start:  Stop:          UNTIMED  Procedure Time Min.   Evaluation Start:  Stop: 48    Start:  Stop:      Total Timed Minutes:  0  Total Timed Units:  0  Total Untimed Units:  1  Charges Billed/# of units:  1    OUTPATIENT PHYSICAL THERAPY   PATIENT EVALUATION  Onset Date: 1 month  Problem List Items Addressed This Visit     Osteoarthritis of spine with radiculopathy, cervical region      Other Visit Diagnoses    None.       Treatment Diagnosis: Decreased mobility with neck pain/LUE numbness    Past Medical History:   Diagnosis Date    Bell's palsy     x4 attacks    Blood glucose abnormal     Dyslipidemia     Vitamin D deficiency disease        Past Surgical History:   Procedure Laterality Date    BREAST SURGERY Left     abcess debrided on breast X 3 surgery     SECTION, LOW TRANSVERSE      x 2    CHOLECYSTECTOMY      TUBAL LIGATION      WOUND DEBRIDEMENT      breast abscess x 3       has a current medication list which includes the following prescription(s): blisovi fe  (28), ciclopirox, ciclopirox, ergocalciferol, gabapentin, loratadine, meloxicam, spironolactone, and urea.    Precautions: standard  Surgical history: see above  Prior Therapy: No  History of Present Illness: Patient is a 38 yo right handed F who presents to physical therapy with c/o insidious onset left-sided neck pain and LUE paraesthesias about a month ago. She first noticed intermittent paraesthesias in her left 4th, 5th digits, which eventually spread to her 2nd, 3rd digits and now reports constant numbness/tingling in her entire left arm from the shoulder down to the hand. Her symptoms are worse worse when trying to sleep (prone) and when sitting with her arm in a dependent position. Her discomfort is lessened when she rests her LUE in an elevated  "position. Was recently started on neurotin which has given her some relief, is now able to sleep a little more. She also has left-sided lateral neck pain/ache intermittently.  Prior Level of Function: Independent  Social History: single mom of 2 children -10 yo boy and 8 yo girl with cerebral palsy; daughter is ambulatory and requires some assistance with bathing, etc   Employment: respiratory therapist   Current level of function: Independent  Functional Deficits Leading to Referral/Nature of Injury: decreased tolerance to activity,   Patient Therapy Goals: Get better      Subjective     Canide Krause states her symptoms are worst at night when trying to sleep, best during the day when she is more active and able to keep herself distracted. Denies weakness, dropping objects.    Pain:  Location: lateral left neck  Description: intermittent, aching, occasional throbbing, lasts about 20-30 min  Activities Which Increase Pain: nothing  Activities Which Decrease Pain: nothing  Pain Scale: 0/10 at best 0/10 now  5-6/10 at worst    Numbness/Paraesthesias: constant LUE numbness/tingling from shoulder to hand/fingers    Objective     Posture: Fwd head position, rounded shoulders, right shoulder depressed  Palpation: Non-tender to palpation, increased tone bilateral UT  Sensation: Decreased to LT LUE  DTRs:   Range of Motion/Strength:    Cervical AROM: WNL all directions, pt with mild "pinching," with terminal extension    Upper Extremity Range of Motion:   Right Upper Extremity: WNL   Left Upper Extremity: WNL    Upper Extremity Strength:   Right Upper Extremity: shoulder flex 4+/5, elbow ext/flex 4+/5, wrist flex/ext 4+/5, handgrip 55 lb   Left Upper Extremity: shoulder flex 4/5, elbow ext/flex 4/5, wrist flex/ext 4+/, handgrip 50 lb        Flexibility: Tightness present bilateral UT  Gait: unremarkable  Bed Mobility: Supine<>sit Independent  Transfers: Sit<>stand Independent  Special Tests:   Cervical compression + " reproduction of radiating symptoms,  C/S in neutral and with left SB/rotation/extension bias  Cervical distraction + decrease in radiating symptoms  ULTT + radial  VA -  Functional Outcome Measure:   Neck Disability Index (NDI): 7/50=14% impairment  The Disabilities of the Arm, Shoulder, and Hand (DASH) Symptom Scale: 11/120=9% impairment  Treatment: Educated on role/goal PT, POC.  Pt verbalizing understanding and agreement.     Assessment       Initial Assessment (Pertinent finding, problem list and factors affecting outcome): Patient is a 42 yo F presenting to PT with c/o neck pain and LUE paraesthesias about a month ago, denies trauma. Notable impairments include postural impairment and impaired functional mobility. Patient would benefit from skilled PT to address notable impairments and improve functional mobility to PLOF.    Rehab Potiential: good    Short Term Goals (3 Weeks): 1) Pt will initiate HEP 2) Patient will report decreased s/s in LUE   Long Term Goals (6 Weeks): 1) Pt will be I with HEP 2) Pt will return to I/ADL's with pain <3/10 and decreased paraesthesias in LUE 3) Pt will improve DASH and NDI to <5% impairment 4) Pt will report being able to sleep with disturbance <2 hrs    Plan     Certification Period: 07/31/17 to 09/15/17  Recommended Treatment Plan: 2 times per week for 6 weeks: Cervical/Lumbar Traction, Gait Training, Group Therapy, Manual Therapy, Moist Heat/ Ice, Patient Education, Therapeutic Activites, Therapeutic Exercise and Other Dry needling PRN  Other Recommendations: Trial of cervical traction (manual, mechanical) and postural strengthening, HEP    Thank you for referral.    Therapist: Janis Goode, PT    I CERTIFY THE NEED FOR THESE SERVICES FURNISHED UNDER THIS PLAN OF TREATMENT AND WHILE UNDER MY CARE    Physician's comments:  ________________________________________________________________________________________________________________________________________________      Physician's Name: ___________________________________

## 2017-08-03 ENCOUNTER — CLINICAL SUPPORT (OUTPATIENT)
Dept: REHABILITATION | Facility: HOSPITAL | Age: 42
End: 2017-08-03
Attending: FAMILY MEDICINE
Payer: COMMERCIAL

## 2017-08-03 DIAGNOSIS — M47.22 OSTEOARTHRITIS OF SPINE WITH RADICULOPATHY, CERVICAL REGION: ICD-10-CM

## 2017-08-03 PROCEDURE — 97012 MECHANICAL TRACTION THERAPY: CPT | Mod: PN

## 2017-08-03 PROCEDURE — 97110 THERAPEUTIC EXERCISES: CPT | Mod: PN

## 2017-08-03 NOTE — PROGRESS NOTES
"Name: Candie Fortune [facility] Number: [id number]  Date of Treatment: 08/03/2017   Diagnosis:   Encounter Diagnosis   Name Primary?    Osteoarthritis of spine with radiculopathy, cervical region        Time in: 1555  Time Out: 1653  Total Treatment Time: 58  Group Time: 0      Subjective:    Candie reports improvement of symptoms.  Patient reports their pain to be 0/10 on a 0-10 scale with 0 being no pain and 10 being the worst pain imaginable.    Objective    Candie received therapeutic exercises to develop strength, endurance, flexibility and posture for 38 minutes including:     UBE 3'/3'  Chin tucks 2x10  Cervical ROM 2x10: rotation, side bend, flexion, extension  UT stretch 3x30" R/L  LS stretch 3x30" R/L    The patient received the following supervised modalities after being cleared for contradictions: Mechanical Cervical Traction:  Rosales received intermittent mechanical traction to the cervical spine at a force of 15 pounds for a total of 20 minutes. Hold time of 30 seconds and rest time for 10 seconds. Patient tolerated treatment well without any adverse effects.    Written Home Exercises Provided: yes    Pt demo good understanding of the education provided. Candie demonstrated good return demonstration of activities.     Assessment:     Pt will continue to benefit from skilled PT intervention. Medical Necessity is demonstrated by:  Pain limits function of effected part for some activities, Requires skilled supervision to complete and progress HEP and Weakness.    Plan:    Continue with established Plan of Care towards PT goals.   "

## 2017-08-15 DIAGNOSIS — M25.50 ARTHRALGIA, UNSPECIFIED JOINT: ICD-10-CM

## 2017-08-15 RX ORDER — MELOXICAM 7.5 MG/1
7.5 TABLET ORAL DAILY
Qty: 30 TABLET | Refills: 3 | Status: SHIPPED | OUTPATIENT
Start: 2017-08-15 | End: 2018-03-02 | Stop reason: SDUPTHER

## 2017-08-18 ENCOUNTER — HOSPITAL ENCOUNTER (OUTPATIENT)
Dept: RADIOLOGY | Facility: HOSPITAL | Age: 42
Discharge: HOME | End: 2017-08-18
Attending: OBSTETRICS & GYNECOLOGY
Payer: COMMERCIAL

## 2017-08-18 ENCOUNTER — OFFICE VISIT (OUTPATIENT)
Dept: OBSTETRICS AND GYNECOLOGY | Facility: CLINIC | Age: 42
End: 2017-08-18
Payer: COMMERCIAL

## 2017-08-18 VITALS
SYSTOLIC BLOOD PRESSURE: 132 MMHG | HEIGHT: 69 IN | BODY MASS INDEX: 43.4 KG/M2 | WEIGHT: 293 LBS | DIASTOLIC BLOOD PRESSURE: 80 MMHG

## 2017-08-18 DIAGNOSIS — Z12.31 VISIT FOR SCREENING MAMMOGRAM: ICD-10-CM

## 2017-08-18 DIAGNOSIS — I10 ESSENTIAL HYPERTENSION: ICD-10-CM

## 2017-08-18 DIAGNOSIS — E28.2 PCOS (POLYCYSTIC OVARIAN SYNDROME): Primary | ICD-10-CM

## 2017-08-18 DIAGNOSIS — E66.01 MORBID OBESITY DUE TO EXCESS CALORIES: ICD-10-CM

## 2017-08-18 PROCEDURE — 3075F SYST BP GE 130 - 139MM HG: CPT | Mod: S$GLB,,, | Performed by: OBSTETRICS & GYNECOLOGY

## 2017-08-18 PROCEDURE — 99214 OFFICE O/P EST MOD 30 MIN: CPT | Mod: S$GLB,,, | Performed by: OBSTETRICS & GYNECOLOGY

## 2017-08-18 PROCEDURE — 3008F BODY MASS INDEX DOCD: CPT | Mod: S$GLB,,, | Performed by: OBSTETRICS & GYNECOLOGY

## 2017-08-18 PROCEDURE — 3079F DIAST BP 80-89 MM HG: CPT | Mod: S$GLB,,, | Performed by: OBSTETRICS & GYNECOLOGY

## 2017-08-18 PROCEDURE — 99999 PR PBB SHADOW E&M-EST. PATIENT-LVL III: CPT | Mod: PBBFAC,,, | Performed by: OBSTETRICS & GYNECOLOGY

## 2017-08-18 RX ORDER — SPIRONOLACTONE 50 MG/1
50 TABLET, FILM COATED ORAL DAILY
Qty: 30 TABLET | Refills: 11 | Status: SHIPPED | OUTPATIENT
Start: 2017-08-18 | End: 2018-02-01 | Stop reason: SDUPTHER

## 2017-08-18 RX ORDER — NORETHINDRONE ACETATE AND ETHINYL ESTRADIOL 1MG-20(21)
1 KIT ORAL DAILY
Qty: 28 TABLET | Refills: 11 | Status: SHIPPED | OUTPATIENT
Start: 2017-08-18 | End: 2018-07-13 | Stop reason: SDUPTHER

## 2017-08-18 NOTE — PROGRESS NOTES
Chief Complaint   Patient presents with    Well Woman    Medication Refill     birth control       History of Present Illness: Candie Krause is a 41 y.o. female that presents today 2017 for   Chief Complaint   Patient presents with    Well Woman    Medication Refill     birth control         Past Medical History:   Diagnosis Date    Abnormal Pap smear of cervix     Bell's palsy     x4 attacks    Blood glucose abnormal     Dyslipidemia     PCOS (polycystic ovarian syndrome)     Vitamin D deficiency disease        Past Surgical History:   Procedure Laterality Date    BREAST SURGERY Left 2008    abcess debrided on breast X 3 surgery     SECTION, LOW TRANSVERSE      x 2    CHOLECYSTECTOMY      GALLBLADDER SURGERY      TUBAL LIGATION      WOUND DEBRIDEMENT      breast abscess x 3       Current Outpatient Prescriptions   Medication Sig Dispense Refill    ciclopirox (LOPROX) 0.77 % Crea Apply topically 2 (two) times daily. 90 g 2    ciclopirox (PENLAC) 8 % Soln Apply topically nightly. 6.6 mL 11    ergocalciferol (ERGOCALCIFEROL) 50,000 unit Cap Take 1 capsule (50,000 Units total) by mouth twice a week. 8 capsule 5    gabapentin (NEURONTIN) 300 MG capsule Take 1 capsule (300 mg total) by mouth every evening. 30 capsule 3    meloxicam (MOBIC) 7.5 MG tablet Take 1 tablet (7.5 mg total) by mouth once daily. Take with food. Long term NSAID use increases risk of GI bleed 30 tablet 3    spironolactone (ALDACTONE) 50 MG tablet Take 1 tablet (50 mg total) by mouth once daily. 30 tablet 11    urea (UMECTA) 40 % Emul Apply 1 application topically 2 (two) times daily. 227 g 11    loratadine (CLARITIN) 10 mg tablet Take 1 tablet (10 mg total) by mouth once daily. 30 tablet 3    naltrexone-bupropion 8-90 mg TbSR Take 2 tablets by mouth 2 (two) times daily. 120 tablet 4    norethindrone-ethinyl estradiol (BLISOVI FE , ,) 1 mg-20 mcg (21)/75 mg (7) per tablet Take 1 tablet by mouth once  "daily. 28 tablet 11     No current facility-administered medications for this visit.        Review of patient's allergies indicates:   Allergen Reactions    Codeine Nausea Only       Family History   Problem Relation Age of Onset    Asthma Father     Heart failure Father     Hypertension Mother     Hypertension Maternal Grandmother     Cancer Maternal Grandmother     Cancer Paternal Grandfather      lung    Rashes / Skin problems Son     Rashes / Skin problems Daughter     Breast cancer Paternal Aunt     Glaucoma Neg Hx     Macular degeneration Neg Hx     Retinal detachment Neg Hx     Collagen disease Neg Hx     Ovarian cancer Neg Hx        Social History   Substance Use Topics    Smoking status: Never Smoker    Smokeless tobacco: Never Used    Alcohol use Yes      Comment: 2 glasses of wine a month       OB History    Para Term  AB Living   2 2 2         SAB TAB Ectopic Multiple Live Births                  # Outcome Date GA Lbr Fermin/2nd Weight Sex Delivery Anes PTL Lv   2 Term      CS-LTranv      1 Term      CS-LTranv         Obstetric Comments   Menarche age 13.   Menses very irregular.       Review of Symptoms:  GENERAL: Denies weight gain or weight loss. Feeling well overall.   SKIN: Denies rash or lesions.   HEAD: Denies head injury or headache.   NODES: Denies enlarged lymph nodes.   CHEST: Denies chest pain or shortness of breath.   CARDIOVASCULAR: Denies palpitations or left sided chest pain.   ABDOMEN: No abdominal pain, constipation, diarrhea, nausea, vomiting or rectal bleeding.   URINARY: No frequency, dysuria, hematuria, or burning on urination.  HEMATOLOGIC: No easy bruisability or excessive bleeding.   MUSCULOSKELETAL: Denies joint pain or swelling.     /80   Ht 5' 9" (1.753 m)   Wt (!) 165.3 kg (364 lb 6.7 oz)   LMP 2017 (Exact Date)   Physical Exam:  APPEARANCE: Well nourished, well developed, in no acute distress.  SKIN: Normal skin turgor, no " lesions.  NECK: Neck symmetric without masses   RESPIRATORY: Normal respiratory effort with no retractions or use of accessory muscles  CARDIOVASCULAR: Peripheral vascular system with no swelling no varicosities and palpation of pulses normal  LYMPHATIC: No enlargements of the lymph nodes noted in the neck, axillae, or groin  ABDOMEN: Soft. No tenderness or masses. No hepatosplenomegaly. No hernias.  BREASTS: Symmetrical, no skin changes or visible lesions. No palpable masses, nipple discharge or adenopathy bilaterally.  EXTREMITIES: No clubbing cyanosis or edema.    ASSESSMENT/PLAN:  PCOS (polycystic ovarian syndrome)  -     norethindrone-ethinyl estradiol (BLISOVI FE 1/20, 28,) 1 mg-20 mcg (21)/75 mg (7) per tablet; Take 1 tablet by mouth once daily.  Dispense: 28 tablet; Refill: 11  -     spironolactone (ALDACTONE) 50 MG tablet; Take 1 tablet (50 mg total) by mouth once daily.  Dispense: 30 tablet; Refill: 11    Visit for screening mammogram  -     Mammo Digital Screening Bilat With CAD; Future; Expected date: 08/18/2017    Essential hypertension  -     spironolactone (ALDACTONE) 50 MG tablet; Take 1 tablet (50 mg total) by mouth once daily.  Dispense: 30 tablet; Refill: 11    Morbid obesity due to excess calories  -     naltrexone-bupropion 8-90 mg TbSR; Take 2 tablets by mouth 2 (two) times daily.  Dispense: 120 tablet; Refill: 4

## 2017-08-24 ENCOUNTER — HOSPITAL ENCOUNTER (OUTPATIENT)
Dept: RADIOLOGY | Facility: HOSPITAL | Age: 42
Discharge: HOME | End: 2017-08-24
Attending: OBSTETRICS & GYNECOLOGY
Payer: COMMERCIAL

## 2017-08-24 DIAGNOSIS — Z12.31 VISIT FOR SCREENING MAMMOGRAM: ICD-10-CM

## 2017-08-24 PROCEDURE — 77063 BREAST TOMOSYNTHESIS BI: CPT | Mod: 26,,, | Performed by: RADIOLOGY

## 2017-08-24 PROCEDURE — 77067 SCR MAMMO BI INCL CAD: CPT | Mod: TC

## 2017-08-24 PROCEDURE — 77067 SCR MAMMO BI INCL CAD: CPT | Mod: 26,,, | Performed by: RADIOLOGY

## 2017-09-21 ENCOUNTER — TELEPHONE (OUTPATIENT)
Dept: FAMILY MEDICINE | Facility: CLINIC | Age: 42
End: 2017-09-21

## 2017-12-27 DIAGNOSIS — M47.22 OSTEOARTHRITIS OF SPINE WITH RADICULOPATHY, CERVICAL REGION: ICD-10-CM

## 2017-12-27 RX ORDER — GABAPENTIN 300 MG/1
CAPSULE ORAL
Qty: 30 CAPSULE | Refills: 3 | Status: SHIPPED | OUTPATIENT
Start: 2017-12-27 | End: 2018-05-21 | Stop reason: SDUPTHER

## 2018-01-19 DIAGNOSIS — M25.50 ARTHRALGIA, UNSPECIFIED JOINT: ICD-10-CM

## 2018-01-22 RX ORDER — MELOXICAM 7.5 MG/1
TABLET ORAL
Qty: 30 TABLET | Refills: 3 | OUTPATIENT
Start: 2018-01-22

## 2018-02-01 DIAGNOSIS — E28.2 PCOS (POLYCYSTIC OVARIAN SYNDROME): ICD-10-CM

## 2018-02-01 DIAGNOSIS — I10 ESSENTIAL HYPERTENSION: ICD-10-CM

## 2018-02-01 RX ORDER — SPIRONOLACTONE 50 MG/1
50 TABLET, FILM COATED ORAL DAILY
Qty: 90 TABLET | Refills: 3 | Status: SHIPPED | OUTPATIENT
Start: 2018-02-01 | End: 2019-04-16 | Stop reason: SDUPTHER

## 2018-02-17 DIAGNOSIS — E55.9 VITAMIN D DEFICIENCY: ICD-10-CM

## 2018-02-17 RX ORDER — ERGOCALCIFEROL 1.25 MG/1
50000 CAPSULE ORAL
Qty: 8 CAPSULE | Refills: 5 | Status: SHIPPED | OUTPATIENT
Start: 2018-02-19 | End: 2018-08-11 | Stop reason: SDUPTHER

## 2018-03-02 DIAGNOSIS — M25.50 ARTHRALGIA, UNSPECIFIED JOINT: ICD-10-CM

## 2018-03-02 RX ORDER — MELOXICAM 7.5 MG/1
TABLET ORAL
Qty: 30 TABLET | Refills: 3 | Status: SHIPPED | OUTPATIENT
Start: 2018-03-02 | End: 2018-06-24 | Stop reason: SDUPTHER

## 2018-05-03 ENCOUNTER — PATIENT MESSAGE (OUTPATIENT)
Dept: FAMILY MEDICINE | Facility: CLINIC | Age: 43
End: 2018-05-03

## 2018-05-04 ENCOUNTER — HOSPITAL ENCOUNTER (EMERGENCY)
Facility: HOSPITAL | Age: 43
Discharge: HOME | End: 2018-05-04
Attending: EMERGENCY MEDICINE
Payer: COMMERCIAL

## 2018-05-04 VITALS
WEIGHT: 293 LBS | HEART RATE: 93 BPM | HEIGHT: 69 IN | SYSTOLIC BLOOD PRESSURE: 185 MMHG | RESPIRATION RATE: 18 BRPM | OXYGEN SATURATION: 97 % | DIASTOLIC BLOOD PRESSURE: 83 MMHG | TEMPERATURE: 98 F | BODY MASS INDEX: 43.4 KG/M2

## 2018-05-04 DIAGNOSIS — M54.50 ACUTE RIGHT-SIDED LOW BACK PAIN WITHOUT SCIATICA: Primary | ICD-10-CM

## 2018-05-04 LAB
ALBUMIN SERPL BCP-MCNC: 3.3 G/DL
ALP SERPL-CCNC: 57 U/L
ALT SERPL W/O P-5'-P-CCNC: 17 U/L
ANION GAP SERPL CALC-SCNC: 11 MMOL/L
AST SERPL-CCNC: 15 U/L
BASOPHILS # BLD AUTO: 0.1 K/UL
BASOPHILS NFR BLD: 0.4 %
BILIRUB SERPL-MCNC: 0.3 MG/DL
BILIRUB UR QL STRIP: NEGATIVE
BUN SERPL-MCNC: 19 MG/DL
CALCIUM SERPL-MCNC: 9.4 MG/DL
CHLORIDE SERPL-SCNC: 107 MMOL/L
CLARITY UR: CLEAR
CO2 SERPL-SCNC: 19 MMOL/L
COLOR UR: YELLOW
CREAT SERPL-MCNC: 0.8 MG/DL
DIFFERENTIAL METHOD: ABNORMAL
EOSINOPHIL # BLD AUTO: 0.3 K/UL
EOSINOPHIL NFR BLD: 2.6 %
ERYTHROCYTE [DISTWIDTH] IN BLOOD BY AUTOMATED COUNT: 13.9 %
EST. GFR  (AFRICAN AMERICAN): >60 ML/MIN/1.73 M^2
EST. GFR  (NON AFRICAN AMERICAN): >60 ML/MIN/1.73 M^2
GLUCOSE SERPL-MCNC: 120 MG/DL
GLUCOSE UR QL STRIP: NEGATIVE
HCT VFR BLD AUTO: 39.3 %
HGB BLD-MCNC: 13.1 G/DL
HGB UR QL STRIP: ABNORMAL
KETONES UR QL STRIP: NEGATIVE
LEUKOCYTE ESTERASE UR QL STRIP: NEGATIVE
LYMPHOCYTES # BLD AUTO: 2.5 K/UL
LYMPHOCYTES NFR BLD: 21.2 %
MCH RBC QN AUTO: 29.3 PG
MCHC RBC AUTO-ENTMCNC: 33.3 G/DL
MCV RBC AUTO: 88 FL
MONOCYTES # BLD AUTO: 0.7 K/UL
MONOCYTES NFR BLD: 5.6 %
NEUTROPHILS # BLD AUTO: 8.3 K/UL
NEUTROPHILS NFR BLD: 70.2 %
NITRITE UR QL STRIP: NEGATIVE
PH UR STRIP: 6 [PH] (ref 5–8)
PLATELET # BLD AUTO: 255 K/UL
PMV BLD AUTO: 7.3 FL
POTASSIUM SERPL-SCNC: 4.2 MMOL/L
PROT SERPL-MCNC: 7.6 G/DL
PROT UR QL STRIP: NEGATIVE
RBC # BLD AUTO: 4.47 M/UL
SODIUM SERPL-SCNC: 137 MMOL/L
SP GR UR STRIP: >=1.03 (ref 1–1.03)
URN SPEC COLLECT METH UR: ABNORMAL
UROBILINOGEN UR STRIP-ACNC: NEGATIVE EU/DL
WBC # BLD AUTO: 11.9 K/UL

## 2018-05-04 PROCEDURE — 36415 COLL VENOUS BLD VENIPUNCTURE: CPT

## 2018-05-04 PROCEDURE — 85025 COMPLETE CBC W/AUTO DIFF WBC: CPT

## 2018-05-04 PROCEDURE — 80053 COMPREHEN METABOLIC PANEL: CPT

## 2018-05-04 PROCEDURE — 81003 URINALYSIS AUTO W/O SCOPE: CPT

## 2018-05-04 PROCEDURE — 63600175 PHARM REV CODE 636 W HCPCS: Performed by: EMERGENCY MEDICINE

## 2018-05-04 PROCEDURE — 96374 THER/PROPH/DIAG INJ IV PUSH: CPT

## 2018-05-04 PROCEDURE — 99284 EMERGENCY DEPT VISIT MOD MDM: CPT | Mod: 25

## 2018-05-04 RX ORDER — LORATADINE 10 MG/1
10 TABLET ORAL DAILY
Qty: 30 TABLET | Refills: 3 | Status: SHIPPED | OUTPATIENT
Start: 2018-05-04 | End: 2020-12-03

## 2018-05-04 RX ORDER — NAPROXEN 500 MG/1
500 TABLET ORAL 2 TIMES DAILY WITH MEALS
Qty: 30 TABLET | Refills: 0 | Status: SHIPPED | OUTPATIENT
Start: 2018-05-04 | End: 2018-05-19

## 2018-05-04 RX ORDER — CYCLOBENZAPRINE HCL 10 MG
10 TABLET ORAL 3 TIMES DAILY PRN
Qty: 15 TABLET | Refills: 0 | Status: SHIPPED | OUTPATIENT
Start: 2018-05-04 | End: 2018-05-09

## 2018-05-04 RX ORDER — KETOROLAC TROMETHAMINE 30 MG/ML
30 INJECTION, SOLUTION INTRAMUSCULAR; INTRAVENOUS
Status: COMPLETED | OUTPATIENT
Start: 2018-05-04 | End: 2018-05-04

## 2018-05-04 RX ADMIN — KETOROLAC TROMETHAMINE 30 MG: 30 INJECTION, SOLUTION INTRAMUSCULAR at 02:05

## 2018-05-04 NOTE — ED PROVIDER NOTES
"Encounter Date: 2018    SCRIBE #1 NOTE: I, Indigo Rose, am scribing for, and in the presence of,  Dr. Rich. I have scribed the entire note.       History     Chief Complaint   Patient presents with    Flank Pain     right sided flank pain intermittently since 11pm      2018 1:53 AM     Chief complaint: Left flank pain      Candie Krause is a 42 y.o. female with a history of back pain who presents to the ED with an onset of worsening intermittent left flank pain without radiation since earlier today with acceleration "around 11 PM tonight" with associated nausea. Patient denies injuring her back, vomiting, or urinary symptoms. She is not currently nauseous. The patient does not endorse having a hx of kidney stones or UTIs. Movement exacerbates her pain. There are no alleviating factors for her symptoms. Her PSHx includes a tubal ligation.      The history is provided by the patient.     Review of patient's allergies indicates:   Allergen Reactions    Codeine Nausea Only    Metformin Rash     Past Medical History:   Diagnosis Date    Abnormal Pap smear of cervix     Bell's palsy     x4 attacks    Blood glucose abnormal     Dyslipidemia     PCOS (polycystic ovarian syndrome)     Vitamin D deficiency disease      Past Surgical History:   Procedure Laterality Date    BREAST CYST EXCISION       SECTION, LOW TRANSVERSE      x 2    CHOLECYSTECTOMY      GALLBLADDER SURGERY      TUBAL LIGATION      WOUND DEBRIDEMENT      breast abscess x 3     Family History   Problem Relation Age of Onset    Asthma Father     Heart failure Father     Hypertension Mother     Hypertension Maternal Grandmother     Cancer Maternal Grandmother     Cancer Paternal Grandfather         lung    Rashes / Skin problems Son     Rashes / Skin problems Daughter     Breast cancer Paternal Aunt     Glaucoma Neg Hx     Macular degeneration Neg Hx     Retinal detachment Neg Hx     Collagen disease Neg " Hx     Ovarian cancer Neg Hx      Social History   Substance Use Topics    Smoking status: Never Smoker    Smokeless tobacco: Never Used    Alcohol use Yes      Comment: 2 glasses of wine a month     Review of Systems   Constitutional: Negative for fever.   HENT: Negative for congestion.    Eyes: Negative for visual disturbance.   Respiratory: Negative for wheezing.    Cardiovascular: Negative for chest pain.   Gastrointestinal: Positive for nausea (PTA). Negative for abdominal pain and vomiting.   Genitourinary: Positive for flank pain (intermittent, left). Negative for decreased urine volume, difficulty urinating, dysuria, frequency, hematuria and urgency.   Musculoskeletal: Negative for joint swelling.   Skin: Negative for rash.   Neurological: Negative for syncope.   Hematological: Does not bruise/bleed easily.   Psychiatric/Behavioral: Negative for confusion.       Physical Exam     Initial Vitals [05/04/18 0144]   BP Pulse Resp Temp SpO2   (!) 185/83 93 18 98 °F (36.7 °C) 97 %      MAP       117         Physical Exam    Nursing note and vitals reviewed.  Constitutional: She appears well-nourished. She is Obese . No distress.   No acute distress.   HENT:   Head: Normocephalic and atraumatic.   Eyes: Conjunctivae and EOM are normal.   Neck: Normal range of motion. Neck supple. No thyroid mass present.   Cardiovascular: Normal rate, regular rhythm and normal heart sounds. Exam reveals no gallop and no friction rub.    No murmur heard.  Neurovascularly intact.    Pulmonary/Chest: Breath sounds normal. She has no wheezes. She has no rhonchi. She has no rales.   Abdominal: Soft. Normal appearance and bowel sounds are normal. There is no tenderness.   No frontal abdominal symptoms.   Musculoskeletal: Normal range of motion.        Lumbar back: She exhibits pain.   Left low back posterior pain.   Neurological: She is alert and oriented to person, place, and time. She has normal strength. No cranial nerve deficit  or sensory deficit.   Skin: Skin is warm and dry. No rash noted. No erythema.   Psychiatric: She has a normal mood and affect. Her speech is normal. Cognition and memory are normal.         ED Course   Procedures  Labs Reviewed   CBC W/ AUTO DIFFERENTIAL - Abnormal; Notable for the following:        Result Value    MPV 7.3 (*)     Gran # (ANC) 8.3 (*)     All other components within normal limits   COMPREHENSIVE METABOLIC PANEL - Abnormal; Notable for the following:     CO2 19 (*)     Glucose 120 (*)     Albumin 3.3 (*)     All other components within normal limits   URINALYSIS        Imaging Results          CT Renal Stone Study ABD Pelvis WO (In process)                    Medical Decision Making:   History:   Old Medical Records: I decided to obtain old medical records.  Clinical Tests:   Lab Tests: Reviewed and Ordered  Radiological Study: Reviewed and Ordered  ED Management:  This patient was interviewed and examined emergently.  Vital signs are stable. She has a stable neurologic exam.  Workup for evidence of progressing UTI, pyelonephritis, kidney stone disease found to be negative and the patient's history and examination is more consistent with a musculoskeletal cause.  Patient will be discharged with a prescription for Flexeril and oral anti-inflammatory pain medication will be asked to rest over the next few days.  She is asked to follow up with her primary care doctor as soon as possible regarding expected improvement or potential referral for lumbar MRI or to see a spine specialist.  She is asked to return to the ER for any new, concerning, or worsening symptoms.  The patient was agreeable with this plan for follow-up and she was discharged in stable condition.            Scribe Attestation:   Scribe #1: I performed the above scribed service and the documentation accurately describes the services I performed. I attest to the accuracy of the note.    I, Dr. Sher Rich, personally performed the services  described in this documentation. All medical record entries made by the scribe were at my direction and in my presence.  I have reviewed the chart and agree that the record reflects my personal performance and is accurate and complete. Sher Rich MD.  6:47 AM 05/04/2018             Clinical Impression:     1. Acute right-sided low back pain without sciatica          Disposition:   Disposition: Discharged  Condition: Stable                        Sher Rich MD  05/04/18 0647

## 2018-05-21 DIAGNOSIS — M47.22 OSTEOARTHRITIS OF SPINE WITH RADICULOPATHY, CERVICAL REGION: ICD-10-CM

## 2018-05-21 RX ORDER — GABAPENTIN 300 MG/1
CAPSULE ORAL
Qty: 30 CAPSULE | Refills: 3 | Status: SHIPPED | OUTPATIENT
Start: 2018-05-21 | End: 2018-09-19 | Stop reason: SDUPTHER

## 2018-06-24 DIAGNOSIS — M25.50 ARTHRALGIA, UNSPECIFIED JOINT: ICD-10-CM

## 2018-06-25 RX ORDER — MELOXICAM 7.5 MG/1
TABLET ORAL
Qty: 30 TABLET | Refills: 0 | Status: SHIPPED | OUTPATIENT
Start: 2018-06-25 | End: 2018-07-22 | Stop reason: SDUPTHER

## 2018-07-13 DIAGNOSIS — E28.2 PCOS (POLYCYSTIC OVARIAN SYNDROME): ICD-10-CM

## 2018-07-14 RX ORDER — NORETHINDRONE ACETATE AND ETHINYL ESTRADIOL 1MG-20(21)
1 KIT ORAL DAILY
Qty: 28 TABLET | Refills: 7 | Status: SHIPPED | OUTPATIENT
Start: 2018-07-14 | End: 2021-05-03

## 2018-07-22 DIAGNOSIS — M25.50 ARTHRALGIA, UNSPECIFIED JOINT: ICD-10-CM

## 2018-07-22 RX ORDER — MELOXICAM 7.5 MG/1
TABLET ORAL
Qty: 30 TABLET | Refills: 0 | Status: SHIPPED | OUTPATIENT
Start: 2018-07-22 | End: 2018-11-30 | Stop reason: SDUPTHER

## 2018-08-11 DIAGNOSIS — E55.9 VITAMIN D DEFICIENCY: ICD-10-CM

## 2018-08-11 RX ORDER — ERGOCALCIFEROL 1.25 MG/1
50000 CAPSULE ORAL
Qty: 8 CAPSULE | Refills: 5 | Status: SHIPPED | OUTPATIENT
Start: 2018-08-13 | End: 2019-08-13

## 2018-09-07 DIAGNOSIS — M25.50 ARTHRALGIA, UNSPECIFIED JOINT: ICD-10-CM

## 2018-09-07 RX ORDER — MELOXICAM 7.5 MG/1
TABLET ORAL
Qty: 30 TABLET | Refills: 0 | OUTPATIENT
Start: 2018-09-07

## 2018-09-19 DIAGNOSIS — M47.22 OSTEOARTHRITIS OF SPINE WITH RADICULOPATHY, CERVICAL REGION: ICD-10-CM

## 2018-09-19 RX ORDER — GABAPENTIN 300 MG/1
CAPSULE ORAL
Qty: 30 CAPSULE | Refills: 2 | Status: SHIPPED | OUTPATIENT
Start: 2018-09-19 | End: 2020-12-03

## 2018-09-19 NOTE — TELEPHONE ENCOUNTER
Last office visit noted on 7-21-17. Requesting 30 day refill until upcoming appointment on 10-9-18. Please advise.   LR--5-21-18  LOV--7-21-17  FOV--10-9-18

## 2018-10-05 ENCOUNTER — OFFICE VISIT (OUTPATIENT)
Dept: FAMILY MEDICINE | Facility: CLINIC | Age: 43
End: 2018-10-05
Payer: COMMERCIAL

## 2018-10-05 ENCOUNTER — LAB VISIT (OUTPATIENT)
Dept: LAB | Facility: HOSPITAL | Age: 43
End: 2018-10-05
Attending: PHYSICIAN ASSISTANT
Payer: COMMERCIAL

## 2018-10-05 VITALS
HEART RATE: 85 BPM | TEMPERATURE: 98 F | DIASTOLIC BLOOD PRESSURE: 72 MMHG | WEIGHT: 293 LBS | BODY MASS INDEX: 43.4 KG/M2 | HEIGHT: 69 IN | SYSTOLIC BLOOD PRESSURE: 138 MMHG

## 2018-10-05 DIAGNOSIS — E55.9 VITAMIN D DEFICIENCY DISEASE: ICD-10-CM

## 2018-10-05 DIAGNOSIS — E78.5 DYSLIPIDEMIA: ICD-10-CM

## 2018-10-05 DIAGNOSIS — R73.9 HYPERGLYCEMIA: ICD-10-CM

## 2018-10-05 DIAGNOSIS — R09.81 SINUS CONGESTION: ICD-10-CM

## 2018-10-05 DIAGNOSIS — I10 ESSENTIAL HYPERTENSION: Primary | ICD-10-CM

## 2018-10-05 DIAGNOSIS — I10 ESSENTIAL HYPERTENSION: ICD-10-CM

## 2018-10-05 DIAGNOSIS — J32.9 SINUSITIS, UNSPECIFIED CHRONICITY, UNSPECIFIED LOCATION: ICD-10-CM

## 2018-10-05 DIAGNOSIS — E66.01 MORBID OBESITY WITH BMI OF 50.0-59.9, ADULT: ICD-10-CM

## 2018-10-05 LAB
25(OH)D3+25(OH)D2 SERPL-MCNC: 27 NG/ML
ALBUMIN SERPL BCP-MCNC: 3.2 G/DL
ALP SERPL-CCNC: 60 U/L
ALT SERPL W/O P-5'-P-CCNC: 20 U/L
ANION GAP SERPL CALC-SCNC: 8 MMOL/L
AST SERPL-CCNC: 19 U/L
BASOPHILS # BLD AUTO: 0.06 K/UL
BASOPHILS NFR BLD: 0.6 %
BILIRUB SERPL-MCNC: 0.3 MG/DL
BUN SERPL-MCNC: 16 MG/DL
CALCIUM SERPL-MCNC: 9 MG/DL
CHLORIDE SERPL-SCNC: 107 MMOL/L
CHOLEST SERPL-MCNC: 162 MG/DL
CHOLEST/HDLC SERPL: 4.4 {RATIO}
CO2 SERPL-SCNC: 24 MMOL/L
CREAT SERPL-MCNC: 0.9 MG/DL
DIFFERENTIAL METHOD: ABNORMAL
EOSINOPHIL # BLD AUTO: 0.3 K/UL
EOSINOPHIL NFR BLD: 3.2 %
ERYTHROCYTE [DISTWIDTH] IN BLOOD BY AUTOMATED COUNT: 13.8 %
EST. GFR  (AFRICAN AMERICAN): >60 ML/MIN/1.73 M^2
EST. GFR  (NON AFRICAN AMERICAN): >60 ML/MIN/1.73 M^2
ESTIMATED AVG GLUCOSE: 103 MG/DL
GLUCOSE SERPL-MCNC: 102 MG/DL
HBA1C MFR BLD HPLC: 5.2 %
HCT VFR BLD AUTO: 39 %
HDLC SERPL-MCNC: 37 MG/DL
HDLC SERPL: 22.8 %
HGB BLD-MCNC: 12.4 G/DL
IMM GRANULOCYTES # BLD AUTO: 0.02 K/UL
IMM GRANULOCYTES NFR BLD AUTO: 0.2 %
LDLC SERPL CALC-MCNC: 61.2 MG/DL
LYMPHOCYTES # BLD AUTO: 2.1 K/UL
LYMPHOCYTES NFR BLD: 21.9 %
MCH RBC QN AUTO: 29.5 PG
MCHC RBC AUTO-ENTMCNC: 31.8 G/DL
MCV RBC AUTO: 93 FL
MONOCYTES # BLD AUTO: 0.9 K/UL
MONOCYTES NFR BLD: 9.7 %
NEUTROPHILS # BLD AUTO: 6.2 K/UL
NEUTROPHILS NFR BLD: 64.4 %
NONHDLC SERPL-MCNC: 125 MG/DL
NRBC BLD-RTO: 0 /100 WBC
PLATELET # BLD AUTO: 244 K/UL
PMV BLD AUTO: 9.3 FL
POTASSIUM SERPL-SCNC: 3.6 MMOL/L
PROT SERPL-MCNC: 7 G/DL
RBC # BLD AUTO: 4.21 M/UL
SODIUM SERPL-SCNC: 139 MMOL/L
TRIGL SERPL-MCNC: 319 MG/DL
TSH SERPL DL<=0.005 MIU/L-ACNC: 1.39 UIU/ML
WBC # BLD AUTO: 9.65 K/UL

## 2018-10-05 PROCEDURE — 99214 OFFICE O/P EST MOD 30 MIN: CPT | Mod: S$GLB,,, | Performed by: PHYSICIAN ASSISTANT

## 2018-10-05 PROCEDURE — 84443 ASSAY THYROID STIM HORMONE: CPT

## 2018-10-05 PROCEDURE — 99999 PR PBB SHADOW E&M-EST. PATIENT-LVL IV: CPT | Mod: PBBFAC,,, | Performed by: PHYSICIAN ASSISTANT

## 2018-10-05 PROCEDURE — 85025 COMPLETE CBC W/AUTO DIFF WBC: CPT

## 2018-10-05 PROCEDURE — 3075F SYST BP GE 130 - 139MM HG: CPT | Mod: CPTII,S$GLB,, | Performed by: PHYSICIAN ASSISTANT

## 2018-10-05 PROCEDURE — 83036 HEMOGLOBIN GLYCOSYLATED A1C: CPT

## 2018-10-05 PROCEDURE — 80061 LIPID PANEL: CPT

## 2018-10-05 PROCEDURE — 3078F DIAST BP <80 MM HG: CPT | Mod: CPTII,S$GLB,, | Performed by: PHYSICIAN ASSISTANT

## 2018-10-05 PROCEDURE — 3008F BODY MASS INDEX DOCD: CPT | Mod: CPTII,S$GLB,, | Performed by: PHYSICIAN ASSISTANT

## 2018-10-05 PROCEDURE — 36415 COLL VENOUS BLD VENIPUNCTURE: CPT | Mod: PO

## 2018-10-05 PROCEDURE — 82306 VITAMIN D 25 HYDROXY: CPT

## 2018-10-05 PROCEDURE — 80053 COMPREHEN METABOLIC PANEL: CPT

## 2018-10-05 RX ORDER — METHYLPREDNISOLONE 4 MG/1
TABLET ORAL
Qty: 1 PACKAGE | Refills: 0 | Status: SHIPPED | OUTPATIENT
Start: 2018-10-05 | End: 2020-12-03

## 2018-10-05 RX ORDER — FLUTICASONE PROPIONATE 50 MCG
2 SPRAY, SUSPENSION (ML) NASAL DAILY
Qty: 16 G | Refills: 0 | Status: SHIPPED | OUTPATIENT
Start: 2018-10-05 | End: 2020-12-03

## 2018-10-05 NOTE — PROGRESS NOTES
Subjective:       Patient ID: Candie Krause is a 42 y.o. female.    Chief Complaint: Follow-up and Sinus Problem    Ms. Krause comes to clinic today for follow up. She is overdue for labs at this time. She reports she is under increased stress as she is moving to [place] so her daughter can go to school there. The patient also complains of sinus pressure and drainage. She has taken dayquil and nyquil for this. She requests a prescription for claritin D. The patient will get her flu shot through her employer.       Review of Systems   Constitutional: Negative for activity change, appetite change and fever.   HENT: Negative for postnasal drip, rhinorrhea and sinus pressure.    Eyes: Negative for visual disturbance.   Respiratory: Negative for cough and shortness of breath.    Cardiovascular: Negative for chest pain.   Gastrointestinal: Negative for abdominal distention and abdominal pain.   Genitourinary: Negative for difficulty urinating and dysuria.   Musculoskeletal: Negative for arthralgias and myalgias.   Neurological: Negative for headaches.   Hematological: Negative for adenopathy.   Psychiatric/Behavioral: The patient is not nervous/anxious.        Objective:      Physical Exam   Constitutional: She is oriented to person, place, and time.   HENT:   Mouth/Throat: Oropharynx is clear and moist. No oropharyngeal exudate.   Nasal turbinates edematous   Eyes: Conjunctivae are normal. Pupils are equal, round, and reactive to light.   Cardiovascular: Normal rate and regular rhythm.   Pulmonary/Chest: Effort normal and breath sounds normal. She has no wheezes.   Abdominal: Soft. Bowel sounds are normal. She exhibits no distension. There is no tenderness.   Musculoskeletal: She exhibits no edema.   Lymphadenopathy:     She has no cervical adenopathy.   Neurological: She is alert and oriented to person, place, and time.   Skin: No erythema.   Psychiatric: Her behavior is normal.       Assessment:       1.  Essential hypertension    2. Dyslipidemia    3. Vitamin D deficiency disease    4. Hyperglycemia    5. Sinusitis, unspecified chronicity, unspecified location    6. Sinus congestion    7. Morbid obesity with BMI of 50.0-59.9, adult        Plan:     Candie was seen today for follow-up and sinus problem.    Diagnoses and all orders for this visit:    Essential hypertension  -     TSH; Future  -     Comprehensive metabolic panel; Future  -     CBC auto differential; Future  Low salt diet    Dyslipidemia  -     Lipid panel; Future    Vitamin D deficiency disease  -     Vitamin D; Future    Hyperglycemia  -     Hemoglobin A1c; Future  -     Comprehensive metabolic panel; Future    Sinusitis, unspecified chronicity, unspecified location  -     methylPREDNISolone (MEDROL DOSEPACK) 4 mg tablet; use as directed  -     fluticasone (FLONASE) 50 mcg/actuation nasal spray; 2 sprays (100 mcg total) by Each Nare route once daily.    Sinus congestion  -     methylPREDNISolone (MEDROL DOSEPACK) 4 mg tablet; use as directed  -     fluticasone (FLONASE) 50 mcg/actuation nasal spray; 2 sprays (100 mcg total) by Each Nare route once daily.    Morbid obesity with BMI of 50.0-59.9, adult  Low carbohydrate, high fiber diet  Increase exercise as able      Patient readiness: acceptance and barriers:none    During the course of the visit the patient was educated and counseled about the following:     Hypertension:   Dietary sodium restriction.  Regular aerobic exercise.  Obesity:   General weight loss/lifestyle modification strategies discussed (elicit support from others; identify saboteurs; non-food rewards, etc).    Goals: Hypertension: Reduce Blood Pressure and Obesity: Reduce calorie intake and BMI    Did patient meet goals/outcomes: No    The following self management tools provided: declined    Patient Instructions (the written plan) was given to the patient/family.     Time spent with patient: 30 minutes    Barriers to medications  present (no )    Adverse reactions to current medications (no)    Over the counter medications reviewed (Yes)

## 2018-11-30 DIAGNOSIS — J32.9 SINUSITIS, UNSPECIFIED CHRONICITY, UNSPECIFIED LOCATION: ICD-10-CM

## 2018-11-30 DIAGNOSIS — R09.81 SINUS CONGESTION: ICD-10-CM

## 2018-11-30 DIAGNOSIS — M25.50 ARTHRALGIA, UNSPECIFIED JOINT: ICD-10-CM

## 2018-11-30 RX ORDER — MELOXICAM 7.5 MG/1
TABLET ORAL
Qty: 30 TABLET | Refills: 0 | Status: SHIPPED | OUTPATIENT
Start: 2018-11-30 | End: 2019-11-26 | Stop reason: ALTCHOICE

## 2018-11-30 RX ORDER — METHYLPREDNISOLONE 4 MG/1
TABLET ORAL
Refills: 0 | OUTPATIENT
Start: 2018-11-30

## 2019-04-16 DIAGNOSIS — E28.2 PCOS (POLYCYSTIC OVARIAN SYNDROME): ICD-10-CM

## 2019-04-16 DIAGNOSIS — I10 ESSENTIAL HYPERTENSION: ICD-10-CM

## 2019-04-16 RX ORDER — SPIRONOLACTONE 50 MG/1
50 TABLET, FILM COATED ORAL DAILY
Qty: 90 TABLET | Refills: 0 | Status: SHIPPED | OUTPATIENT
Start: 2019-04-16 | End: 2019-07-19 | Stop reason: SDUPTHER

## 2019-07-19 DIAGNOSIS — E28.2 PCOS (POLYCYSTIC OVARIAN SYNDROME): ICD-10-CM

## 2019-07-19 DIAGNOSIS — I10 ESSENTIAL HYPERTENSION: ICD-10-CM

## 2019-07-20 RX ORDER — SPIRONOLACTONE 50 MG/1
50 TABLET, FILM COATED ORAL DAILY
Qty: 90 TABLET | Refills: 0 | Status: SHIPPED | OUTPATIENT
Start: 2019-07-20 | End: 2020-12-03 | Stop reason: SDUPTHER

## 2019-09-18 ENCOUNTER — TELEPHONE (OUTPATIENT)
Dept: ORTHOPEDICS | Facility: CLINIC | Age: 44
End: 2019-09-18

## 2019-09-18 NOTE — TELEPHONE ENCOUNTER
----- Message from Fritz Mcknight sent at 9/18/2019 10:03 AM CDT -----  Contact: pt  Type:  Sooner Apoointment Request    Caller is requesting a sooner appointment.  Caller declined first available appointment listed below.  Caller will not accept being placed on the waitlist and is requesting a message be sent to doctor.    Name of Caller:  pt  When is the first available appointment?  9/23/19  Symptoms:  right knee pain  Best Call Back Number:  [phone]  Additional Information:  Pt would like to be seen sometime this week if possible.

## 2019-10-01 DIAGNOSIS — M25.569 KNEE PAIN, UNSPECIFIED CHRONICITY, UNSPECIFIED LATERALITY: Primary | ICD-10-CM

## 2019-10-03 ENCOUNTER — OFFICE VISIT (OUTPATIENT)
Dept: ORTHOPEDICS | Facility: CLINIC | Age: 44
End: 2019-10-03
Payer: COMMERCIAL

## 2019-10-03 ENCOUNTER — HOSPITAL ENCOUNTER (OUTPATIENT)
Dept: RADIOLOGY | Facility: HOSPITAL | Age: 44
Discharge: HOME | End: 2019-10-03
Attending: ORTHOPAEDIC SURGERY
Payer: COMMERCIAL

## 2019-10-03 VITALS
BODY MASS INDEX: 43.4 KG/M2 | HEART RATE: 75 BPM | WEIGHT: 293 LBS | SYSTOLIC BLOOD PRESSURE: 157 MMHG | DIASTOLIC BLOOD PRESSURE: 84 MMHG | HEIGHT: 69 IN

## 2019-10-03 DIAGNOSIS — M17.11 ARTHRITIS OF RIGHT KNEE: Primary | ICD-10-CM

## 2019-10-03 DIAGNOSIS — M25.571 RIGHT ANKLE PAIN, UNSPECIFIED CHRONICITY: ICD-10-CM

## 2019-10-03 DIAGNOSIS — M25.569 KNEE PAIN, UNSPECIFIED CHRONICITY, UNSPECIFIED LATERALITY: ICD-10-CM

## 2019-10-03 DIAGNOSIS — M25.571 RIGHT ANKLE PAIN, UNSPECIFIED CHRONICITY: Primary | ICD-10-CM

## 2019-10-03 PROCEDURE — 99213 OFFICE O/P EST LOW 20 MIN: CPT | Mod: S$GLB,,, | Performed by: ORTHOPAEDIC SURGERY

## 2019-10-03 PROCEDURE — 73562 X-RAY EXAM OF KNEE 3: CPT | Mod: 26,59,LT, | Performed by: RADIOLOGY

## 2019-10-03 PROCEDURE — 99999 PR PBB SHADOW E&M-EST. PATIENT-LVL III: CPT | Mod: PBBFAC,,, | Performed by: ORTHOPAEDIC SURGERY

## 2019-10-03 PROCEDURE — 3008F PR BODY MASS INDEX (BMI) DOCUMENTED: ICD-10-PCS | Mod: CPTII,S$GLB,, | Performed by: ORTHOPAEDIC SURGERY

## 2019-10-03 PROCEDURE — 73610 XR ANKLE COMPLETE 3 VIEW RIGHT: ICD-10-PCS | Mod: 26,RT,, | Performed by: RADIOLOGY

## 2019-10-03 PROCEDURE — 3077F SYST BP >= 140 MM HG: CPT | Mod: CPTII,S$GLB,, | Performed by: ORTHOPAEDIC SURGERY

## 2019-10-03 PROCEDURE — 73562 XR KNEE ORTHO RIGHT WITH FLEXION: ICD-10-PCS | Mod: 26,59,LT, | Performed by: RADIOLOGY

## 2019-10-03 PROCEDURE — 73564 X-RAY EXAM KNEE 4 OR MORE: CPT | Mod: 26,RT,, | Performed by: RADIOLOGY

## 2019-10-03 PROCEDURE — 3077F PR MOST RECENT SYSTOLIC BLOOD PRESSURE >= 140 MM HG: ICD-10-PCS | Mod: CPTII,S$GLB,, | Performed by: ORTHOPAEDIC SURGERY

## 2019-10-03 PROCEDURE — 99213 PR OFFICE/OUTPT VISIT, EST, LEVL III, 20-29 MIN: ICD-10-PCS | Mod: S$GLB,,, | Performed by: ORTHOPAEDIC SURGERY

## 2019-10-03 PROCEDURE — 3008F BODY MASS INDEX DOCD: CPT | Mod: CPTII,S$GLB,, | Performed by: ORTHOPAEDIC SURGERY

## 2019-10-03 PROCEDURE — 99999 PR PBB SHADOW E&M-EST. PATIENT-LVL III: ICD-10-PCS | Mod: PBBFAC,,, | Performed by: ORTHOPAEDIC SURGERY

## 2019-10-03 PROCEDURE — 3079F DIAST BP 80-89 MM HG: CPT | Mod: CPTII,S$GLB,, | Performed by: ORTHOPAEDIC SURGERY

## 2019-10-03 PROCEDURE — 73564 XR KNEE ORTHO RIGHT WITH FLEXION: ICD-10-PCS | Mod: 26,RT,, | Performed by: RADIOLOGY

## 2019-10-03 PROCEDURE — 73610 X-RAY EXAM OF ANKLE: CPT | Mod: 26,RT,, | Performed by: RADIOLOGY

## 2019-10-03 PROCEDURE — 73610 X-RAY EXAM OF ANKLE: CPT | Mod: TC,PN,RT

## 2019-10-03 PROCEDURE — 73564 X-RAY EXAM KNEE 4 OR MORE: CPT | Mod: TC,PN,RT

## 2019-10-03 PROCEDURE — 3079F PR MOST RECENT DIASTOLIC BLOOD PRESSURE 80-89 MM HG: ICD-10-PCS | Mod: CPTII,S$GLB,, | Performed by: ORTHOPAEDIC SURGERY

## 2019-10-03 NOTE — PROGRESS NOTES
Past Medical History:   Diagnosis Date    Abnormal Pap smear of cervix     Bell's palsy     x4 attacks    Blood glucose abnormal     Dyslipidemia     PCOS (polycystic ovarian syndrome)     Vitamin D deficiency disease        Past Surgical History:   Procedure Laterality Date    BREAST CYST EXCISION       SECTION, LOW TRANSVERSE      x 2    CHOLECYSTECTOMY      GALLBLADDER SURGERY      TUBAL LIGATION      WOUND DEBRIDEMENT      breast abscess x 3       Current Outpatient Medications   Medication Sig    BLISOVI FE , 28, 1 mg-20 mcg (21)/75 mg (7) per tablet TAKE 1 TABLET BY MOUTH ONCE DAILY.    ciclopirox (LOPROX) 0.77 % Crea Apply topically 2 (two) times daily.    ciclopirox (PENLAC) 8 % Soln Apply topically nightly.    fluticasone (FLONASE) 50 mcg/actuation nasal spray 2 sprays (100 mcg total) by Each Nare route once daily.    gabapentin (NEURONTIN) 300 MG capsule TAKE ONE CAPSULE BY MOUTH IN THE EVENING    meloxicam (MOBIC) 7.5 MG tablet TAKE 1 TABLET BY MOUTH DAILY WITH FOOD    methylPREDNISolone (MEDROL DOSEPACK) 4 mg tablet use as directed    naltrexone-bupropion 8-90 mg TbSR Take 2 tablets by mouth 2 (two) times daily. (Patient taking differently: Take 2 tablets by mouth once daily. )    spironolactone (ALDACTONE) 50 MG tablet TAKE 1 TABLET (50 MG TOTAL) BY MOUTH ONCE DAILY.    urea (UMECTA) 40 % Emul Apply 1 application topically 2 (two) times daily.    loratadine (CLARITIN) 10 mg tablet Take 1 tablet (10 mg total) by mouth once daily.     No current facility-administered medications for this visit.        Review of patient's allergies indicates:   Allergen Reactions    Codeine Nausea Only       Family History   Problem Relation Age of Onset    Asthma Father     Heart failure Father     Hypertension Mother     Hypertension Maternal Grandmother     Cancer Maternal Grandmother     Cancer Paternal Grandfather         lung    Rashes / Skin problems Son     Rashes / Skin  problems Daughter     Breast cancer Paternal Aunt     Glaucoma Neg Hx     Macular degeneration Neg Hx     Retinal detachment Neg Hx     Collagen disease Neg Hx     Ovarian cancer Neg Hx        Social History     Socioeconomic History    Marital status:      Spouse name: Not on file    Number of children: 2    Years of education: Not on file    Highest education level: Not on file   Occupational History     Employer: [employer]   Social Needs    Financial resource strain: Not on file    Food insecurity:     Worry: Not on file     Inability: Not on file    Transportation needs:     Medical: Not on file     Non-medical: Not on file   Tobacco Use    Smoking status: Never Smoker    Smokeless tobacco: Never Used   Substance and Sexual Activity    Alcohol use: Yes     Comment: 2 glasses of wine a month    Drug use: No    Sexual activity: Yes     Partners: Male     Birth control/protection: Surgical   Lifestyle    Physical activity:     Days per week: Not on file     Minutes per session: Not on file    Stress: Not on file   Relationships    Social connections:     Talks on phone: Not on file     Gets together: Not on file     Attends Yazdanism service: Not on file     Active member of club or organization: Not on file     Attends meetings of clubs or organizations: Not on file     Relationship status: Not on file   Other Topics Concern    Not on file   Social History Narrative    Not on file       Chief Complaint:   Chief Complaint   Patient presents with    Knee Pain     right knee pain    Ankle Pain     right ankle pain       History of present illness: 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  Patient's had pain now for a few years.  I gave her a cortisone injection a few years ago which helped a little.  Pain along the anterior and medial aspect of her knee.  Also complaining of about 1 week of right ankle pain.  No injury to  the ankle.  Pain is an 8/10 along the medial ankle.  Pain is a 4/10 in the knee.      Review of Systems:    Constitution: Negative for chills, fever, and sweats.  Negative for unexplained weight loss.    HENT:  Negative for headaches and blurry vision.    Cardiovascular:Negative for chest pain or irregular heart beat. Negative for hypertension.    Respiratory:  Negative for cough and shortness of breath.    Gastrointestinal: Negative for abdominal pain, heartburn, melena, nausea, and vomitting.    Genitourinary:  Negative bladder incontinence and dysuria.    Musculoskeletal:  See HPI    Neurological: Negative for numbness.    Psychiatric/Behavioral: Negative for depression.  The patient is not nervous/anxious.      Endocrine: Negative for polyuria    Hematologic/Lymphatic: Negative for bleeding problem.  Does  bruise/bleed easily.    Skin: Negative for poor would healing and rash      Physical Examination:    Vital Signs:    Vitals:    10/03/19 1048   BP: (!) 157/84   Pulse: 75       Body mass index is 53.16 kg/m².    This a well-developed, well nourished patient in no acute distress.  They are alert and oriented and cooperative to examination.  Pt. walks without an antalgic gait.      Examination of the right knee shows no rashes or erythema. There are no masses ecchymosis or effusion. Patient has full range of motion from 0-130°. Patient is mildly tender to palpation over lateral joint line and nontender to palpation over the medial joint line. Patient has a - Lachman exam, - anterior drawer exam, and - posterior drawer exam. - Emory's exam. Knee is stable to varus and valgus stress. 5 out of 5 motor strength. Palpable distal pulses. Intact light touch sensation. Negative Patellofemoral crepitus    Examination of the patient's right foot and ankle shows no signs of rashes or erythema. The patient has no ecchymosis or effusion or masses. The patient has a negative anterior drawer and talar tilting exam. The  patient has full range of motion of ankle dorsiflexion, plantarflexion, inversion, and eversion. Patient has 5 out of 5 motor strength in all muscle groups. Patient has 2+ dorsalis pedis pulses and intact light touch sensation. The patient is tender along the posterior tibial tendon. Patient has significant flatfoot deformity    X-rays: X-rays of the right knee are ordered and reviewed which show some very mild arthritic changes.  Some spurring underneath the patella.  Mild calcification within the lateral meniscus.  X-rays of the right ankle are ordered and reviewed which show severe flatfoot deformity with heel spurs both along the Achilles insertion and along the plantar fascia origin.     Assessment: Mild right knee arthritis  Right posterior tibial tendinitis    Plan:  I reviewed the findings and the x-rays with her  today.  We talked about hyaluronic acid series.  She has tried cortisone injections.  We will get authorization for Synvisc series.  Also recommended some good arch supports for her flat feet.  Needs to wear better shoes and not flip flops.    This note was created using Dragon voice recognition software that occasionally misinterpreted phrases or words.    Consult note is delivered via Epic messaging service.

## 2019-10-04 DIAGNOSIS — M17.11 PRIMARY OSTEOARTHRITIS OF RIGHT KNEE: Primary | ICD-10-CM

## 2019-10-17 ENCOUNTER — LAB VISIT (OUTPATIENT)
Dept: LAB | Facility: HOSPITAL | Age: 44
End: 2019-10-17
Attending: OBSTETRICS & GYNECOLOGY
Payer: COMMERCIAL

## 2019-10-17 DIAGNOSIS — Z12.31 VISIT FOR SCREENING MAMMOGRAM: Primary | ICD-10-CM

## 2019-10-17 DIAGNOSIS — E28.2 PCO (POLYCYSTIC OVARIES): Primary | ICD-10-CM

## 2019-10-17 LAB
25(OH)D3+25(OH)D2 SERPL-MCNC: 19 NG/ML (ref 30–96)
CHOLEST SERPL-MCNC: 180 MG/DL (ref 120–199)
CHOLEST/HDLC SERPL: 4.2 {RATIO} (ref 2–5)
GLUCOSE SERPL-MCNC: 92 MG/DL (ref 70–110)
HDLC SERPL-MCNC: 43 MG/DL (ref 40–75)
HDLC SERPL: 23.9 % (ref 20–50)
LDLC SERPL CALC-MCNC: 106.6 MG/DL (ref 63–159)
NONHDLC SERPL-MCNC: 137 MG/DL
TRIGL SERPL-MCNC: 152 MG/DL (ref 30–150)
TSH SERPL DL<=0.005 MIU/L-ACNC: 3.9 UIU/ML (ref 0.4–4)

## 2019-10-17 PROCEDURE — 82947 ASSAY GLUCOSE BLOOD QUANT: CPT

## 2019-10-17 PROCEDURE — 80061 LIPID PANEL: CPT

## 2019-10-17 PROCEDURE — 36415 COLL VENOUS BLD VENIPUNCTURE: CPT

## 2019-10-17 PROCEDURE — 84443 ASSAY THYROID STIM HORMONE: CPT

## 2019-10-17 PROCEDURE — 82306 VITAMIN D 25 HYDROXY: CPT

## 2019-10-18 LAB — PAP SMEAR: NORMAL

## 2019-10-21 ENCOUNTER — TELEPHONE (OUTPATIENT)
Dept: ORTHOPEDICS | Facility: CLINIC | Age: 44
End: 2019-10-21

## 2019-10-21 NOTE — TELEPHONE ENCOUNTER
Patient called today asking about her xray of her ankle. She stated that the report said that she had a fracture. Please advise.    Thanks

## 2019-10-21 NOTE — TELEPHONE ENCOUNTER
----- Message from Gala Rubalcava sent at 10/21/2019  8:14 AM CDT -----  Contact: Patient  Type: Needs Medical Advice    Who Called:  Patient  Best Call Back Number: [phone] (home) [phone] (work)  Additional Information: Patient wants to know if Insurance has approved her for the injection for her knee. Patient asked for a call to confirm please call to advise, her appointment is on Thursday. Thanks.

## 2019-10-24 ENCOUNTER — OFFICE VISIT (OUTPATIENT)
Dept: ORTHOPEDICS | Facility: CLINIC | Age: 44
End: 2019-10-24
Payer: COMMERCIAL

## 2019-10-24 VITALS — WEIGHT: 293 LBS | BODY MASS INDEX: 43.4 KG/M2 | HEIGHT: 69 IN

## 2019-10-24 DIAGNOSIS — M17.11 PRIMARY OSTEOARTHRITIS OF RIGHT KNEE: Primary | ICD-10-CM

## 2019-10-24 PROCEDURE — 99999 PR PBB SHADOW E&M-EST. PATIENT-LVL II: ICD-10-PCS | Mod: PBBFAC,,, | Performed by: ORTHOPAEDIC SURGERY

## 2019-10-24 PROCEDURE — 99999 PR PBB SHADOW E&M-EST. PATIENT-LVL II: CPT | Mod: PBBFAC,,, | Performed by: ORTHOPAEDIC SURGERY

## 2019-10-24 PROCEDURE — 20610 LARGE JOINT ASPIRATION/INJECTION: R KNEE: ICD-10-PCS | Mod: RT,S$GLB,, | Performed by: ORTHOPAEDIC SURGERY

## 2019-10-24 PROCEDURE — 99499 NO LOS: ICD-10-PCS | Mod: S$GLB,,, | Performed by: ORTHOPAEDIC SURGERY

## 2019-10-24 PROCEDURE — 99499 UNLISTED E&M SERVICE: CPT | Mod: S$GLB,,, | Performed by: ORTHOPAEDIC SURGERY

## 2019-10-24 PROCEDURE — 20610 DRAIN/INJ JOINT/BURSA W/O US: CPT | Mod: RT,S$GLB,, | Performed by: ORTHOPAEDIC SURGERY

## 2019-10-24 NOTE — PROGRESS NOTES
Past Medical History:   Diagnosis Date    Abnormal Pap smear of cervix     Bell's palsy     x4 attacks    Blood glucose abnormal     Dyslipidemia     PCOS (polycystic ovarian syndrome)     Vitamin D deficiency disease        Past Surgical History:   Procedure Laterality Date    BREAST CYST EXCISION       SECTION, LOW TRANSVERSE      x 2    CHOLECYSTECTOMY      GALLBLADDER SURGERY      TUBAL LIGATION      WOUND DEBRIDEMENT      breast abscess x 3       Current Outpatient Medications   Medication Sig    BLISOVI FE , 28, 1 mg-20 mcg (21)/75 mg (7) per tablet TAKE 1 TABLET BY MOUTH ONCE DAILY.    ciclopirox (LOPROX) 0.77 % Crea Apply topically 2 (two) times daily.    ciclopirox (PENLAC) 8 % Soln Apply topically nightly.    fluticasone (FLONASE) 50 mcg/actuation nasal spray 2 sprays (100 mcg total) by Each Nare route once daily.    gabapentin (NEURONTIN) 300 MG capsule TAKE ONE CAPSULE BY MOUTH IN THE EVENING    meloxicam (MOBIC) 7.5 MG tablet TAKE 1 TABLET BY MOUTH DAILY WITH FOOD    methylPREDNISolone (MEDROL DOSEPACK) 4 mg tablet use as directed    naltrexone-bupropion 8-90 mg TbSR Take 2 tablets by mouth 2 (two) times daily. (Patient taking differently: Take 2 tablets by mouth once daily. )    spironolactone (ALDACTONE) 50 MG tablet TAKE 1 TABLET (50 MG TOTAL) BY MOUTH ONCE DAILY.    urea (UMECTA) 40 % Emul Apply 1 application topically 2 (two) times daily.    loratadine (CLARITIN) 10 mg tablet Take 1 tablet (10 mg total) by mouth once daily.     No current facility-administered medications for this visit.        Review of patient's allergies indicates:   Allergen Reactions    Codeine Nausea Only       Family History   Problem Relation Age of Onset    Asthma Father     Heart failure Father     Hypertension Mother     Hypertension Maternal Grandmother     Cancer Maternal Grandmother     Cancer Paternal Grandfather         lung    Rashes / Skin problems Son     Rashes / Skin  problems Daughter     Breast cancer Paternal Aunt     Glaucoma Neg Hx     Macular degeneration Neg Hx     Retinal detachment Neg Hx     Collagen disease Neg Hx     Ovarian cancer Neg Hx        Social History     Socioeconomic History    Marital status:      Spouse name: Not on file    Number of children: 2    Years of education: Not on file    Highest education level: Not on file   Occupational History     Employer: [employer]   Social Needs    Financial resource strain: Not on file    Food insecurity:     Worry: Not on file     Inability: Not on file    Transportation needs:     Medical: Not on file     Non-medical: Not on file   Tobacco Use    Smoking status: Never Smoker    Smokeless tobacco: Never Used   Substance and Sexual Activity    Alcohol use: Yes     Comment: 2 glasses of wine a month    Drug use: No    Sexual activity: Yes     Partners: Male     Birth control/protection: Surgical   Lifestyle    Physical activity:     Days per week: Not on file     Minutes per session: Not on file    Stress: Not on file   Relationships    Social connections:     Talks on phone: Not on file     Gets together: Not on file     Attends [employer] service: Not on file     Active member of club or organization: Not on file     Attends meetings of clubs or organizations: Not on file     Relationship status: Not on file   Other Topics Concern    Not on file   Social History Narrative    Not on file       Chief Complaint:   Chief Complaint   Patient presents with    Knee Pain     right knee-Synvisc 1/3        History of present illness: 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  Patient's had pain now for a few years.  I gave her a cortisone injection a few years ago which helped a little.  Pain along the anterior and medial aspect of her knee.  Also complaining of about 1 week of right ankle pain.  No injury to the ankle.  Pain is an 8/10  along the medial ankle.  Pain is a 4/10 in the knee.      Review of Systems:    Constitution: Negative for chills, fever, and sweats.  Negative for unexplained weight loss.    HENT:  Negative for headaches and blurry vision.    Cardiovascular:Negative for chest pain or irregular heart beat. Negative for hypertension.    Respiratory:  Negative for cough and shortness of breath.    Gastrointestinal: Negative for abdominal pain, heartburn, melena, nausea, and vomitting.    Genitourinary:  Negative bladder incontinence and dysuria.    Musculoskeletal:  See HPI    Neurological: Negative for numbness.    Psychiatric/Behavioral: Negative for depression.  The patient is not nervous/anxious.      Endocrine: Negative for polyuria    Hematologic/Lymphatic: Negative for bleeding problem.  Does  bruise/bleed easily.    Skin: Negative for poor would healing and rash      Physical Examination:    Vital Signs:    There were no vitals filed for this visit.    Body mass index is 53.16 kg/m².    This a well-developed, well nourished patient in no acute distress.  They are alert and oriented and cooperative to examination.  Pt. walks without an antalgic gait.      Examination of the right knee shows no rashes or erythema. There are no masses ecchymosis or effusion. Patient has full range of motion from 0-130°. Patient is mildly tender to palpation over lateral joint line and nontender to palpation over the medial joint line.  Knee is stable to varus and valgus stress. 5 out of 5 motor strength. Palpable distal pulses. Intact light touch sensation. Negative Patellofemoral crepitus        X-rays: X-rays of the right knee are reviewed which show some very mild arthritic changes.  Some spurring underneath the patella.  Mild calcification within the lateral meniscus.  X-rays of the right ankle are reviewed which show severe flatfoot deformity with heel spurs both along the Achilles insertion and along the plantar fascia origin.  There is a  small nondisplaced linear line through the tip of the medial malleolus with possible fracture     Assessment: Mild right knee arthritis  Right posterior tibial tendinitis versus nondisplaced medial malleolus fracture    Plan:  I reviewed the findings and the x-rays with her  today.  I injected her with Synvisc 1 of 3.  Follow up next week.    This note was created using voice recognition software that occasionally misinterpreted phrases or words.    Consult note is delivered via Epic messaging service.

## 2019-10-24 NOTE — PROCEDURES
Large Joint Aspiration/Injection: R knee  Date/Time: 10/24/2019 9:45 AM  Performed by: Zackary Nunn MD  Authorized by: Zackary Nunn MD     Consent Done?:  Yes (Verbal)  Indications:  Pain  Procedure site marked: Yes    Timeout: Prior to procedure the correct patient, procedure, and site was verified      Location:  Knee  Site:  R knee  Prep: Patient was prepped and draped in usual sterile fashion    Needle size:  20 G  Approach:  Anterolateral  Medications:  16 mg hyaluronate 16 mg/2 mL  Patient tolerance:  Patient tolerated the procedure well with no immediate complications

## 2019-10-28 ENCOUNTER — OFFICE VISIT (OUTPATIENT)
Dept: ORTHOPEDICS | Facility: CLINIC | Age: 44
End: 2019-10-28
Payer: COMMERCIAL

## 2019-10-28 DIAGNOSIS — M17.11 PRIMARY OSTEOARTHRITIS OF RIGHT KNEE: Primary | ICD-10-CM

## 2019-10-28 PROCEDURE — 99999 PR PBB SHADOW E&M-EST. PATIENT-LVL II: CPT | Mod: PBBFAC,,, | Performed by: ORTHOPAEDIC SURGERY

## 2019-10-28 PROCEDURE — 99499 NO LOS: ICD-10-PCS | Mod: S$GLB,,, | Performed by: ORTHOPAEDIC SURGERY

## 2019-10-28 PROCEDURE — 20610 LARGE JOINT ASPIRATION/INJECTION: R KNEE: ICD-10-PCS | Mod: RT,S$GLB,, | Performed by: ORTHOPAEDIC SURGERY

## 2019-10-28 PROCEDURE — 20610 DRAIN/INJ JOINT/BURSA W/O US: CPT | Mod: RT,S$GLB,, | Performed by: ORTHOPAEDIC SURGERY

## 2019-10-28 PROCEDURE — 99999 PR PBB SHADOW E&M-EST. PATIENT-LVL II: ICD-10-PCS | Mod: PBBFAC,,, | Performed by: ORTHOPAEDIC SURGERY

## 2019-10-28 PROCEDURE — 99499 UNLISTED E&M SERVICE: CPT | Mod: S$GLB,,, | Performed by: ORTHOPAEDIC SURGERY

## 2019-10-28 NOTE — PROCEDURES
Large Joint Aspiration/Injection: R knee  Date/Time: 10/28/2019 10:45 AM  Performed by: Zackary Nunn MD  Authorized by: Zackary Nunn MD     Consent Done?:  Yes (Verbal)  Indications:  Pain  Procedure site marked: Yes    Timeout: Prior to procedure the correct patient, procedure, and site was verified      Location:  Knee  Site:  R knee  Prep: Patient was prepped and draped in usual sterile fashion    Needle size:  20 G  Approach:  Anterolateral  Medications:  16 mg hyaluronate 16 mg/2 mL  Patient tolerance:  Patient tolerated the procedure well with no immediate complications

## 2019-10-28 NOTE — PROGRESS NOTES
Past Medical History:   Diagnosis Date    Abnormal Pap smear of cervix     Bell's palsy     x4 attacks    Blood glucose abnormal     Dyslipidemia     PCOS (polycystic ovarian syndrome)     Vitamin D deficiency disease        Past Surgical History:   Procedure Laterality Date    BREAST CYST EXCISION       SECTION, LOW TRANSVERSE      x 2    CHOLECYSTECTOMY      GALLBLADDER SURGERY      TUBAL LIGATION      WOUND DEBRIDEMENT      breast abscess x 3       Current Outpatient Medications   Medication Sig    BLISOVI FE , 28, 1 mg-20 mcg (21)/75 mg (7) per tablet TAKE 1 TABLET BY MOUTH ONCE DAILY.    ciclopirox (LOPROX) 0.77 % Crea Apply topically 2 (two) times daily.    ciclopirox (PENLAC) 8 % Soln Apply topically nightly.    fluticasone (FLONASE) 50 mcg/actuation nasal spray 2 sprays (100 mcg total) by Each Nare route once daily.    gabapentin (NEURONTIN) 300 MG capsule TAKE ONE CAPSULE BY MOUTH IN THE EVENING    meloxicam (MOBIC) 7.5 MG tablet TAKE 1 TABLET BY MOUTH DAILY WITH FOOD    methylPREDNISolone (MEDROL DOSEPACK) 4 mg tablet use as directed    naltrexone-bupropion 8-90 mg TbSR Take 2 tablets by mouth 2 (two) times daily. (Patient taking differently: Take 2 tablets by mouth once daily. )    spironolactone (ALDACTONE) 50 MG tablet TAKE 1 TABLET (50 MG TOTAL) BY MOUTH ONCE DAILY.    urea (UMECTA) 40 % Emul Apply 1 application topically 2 (two) times daily.    loratadine (CLARITIN) 10 mg tablet Take 1 tablet (10 mg total) by mouth once daily.     No current facility-administered medications for this visit.        Review of patient's allergies indicates:   Allergen Reactions    Codeine Nausea Only       Family History   Problem Relation Age of Onset    Asthma Father     Heart failure Father     Hypertension Mother     Hypertension Maternal Grandmother     Cancer Maternal Grandmother     Cancer Paternal Grandfather         lung    Rashes / Skin problems Son     Rashes / Skin  problems Daughter     Breast cancer Paternal Aunt     Glaucoma Neg Hx     Macular degeneration Neg Hx     Retinal detachment Neg Hx     Collagen disease Neg Hx     Ovarian cancer Neg Hx        Social History     Socioeconomic History    Marital status:      Spouse name: Not on file    Number of children: 2    Years of education: Not on file    Highest education level: Not on file   Occupational History     Employer: [employer]   Social Needs    Financial resource strain: Not on file    Food insecurity:     Worry: Not on file     Inability: Not on file    Transportation needs:     Medical: Not on file     Non-medical: Not on file   Tobacco Use    Smoking status: Never Smoker    Smokeless tobacco: Never Used   Substance and Sexual Activity    Alcohol use: Yes     Comment: 2 glasses of wine a month    Drug use: No    Sexual activity: Yes     Partners: Male     Birth control/protection: Surgical   Lifestyle    Physical activity:     Days per week: Not on file     Minutes per session: Not on file    Stress: Not on file   Relationships    Social connections:     Talks on phone: Not on file     Gets together: Not on file     Attends [employer] service: Not on file     Active member of club or organization: Not on file     Attends meetings of clubs or organizations: Not on file     Relationship status: Not on file   Other Topics Concern    Not on file   Social History Narrative    Not on file       Chief Complaint:   Chief Complaint   Patient presents with    Knee Pain     R synvisc 2/3       History of present illness: 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  Patient's had pain now for a few years.  I gave her a cortisone injection a few years ago which helped a little.  Pain along the anterior and medial aspect of her knee.  Also complaining of about 1 week of right ankle pain.  No injury to the ankle.  Pain is an 8/10 along the  medial ankle.  Pain is a 4/10 in the knee.      Review of Systems:    Constitution: Negative for chills, fever, and sweats.  Negative for unexplained weight loss.    HENT:  Negative for headaches and blurry vision.    Cardiovascular:Negative for chest pain or irregular heart beat. Negative for hypertension.    Respiratory:  Negative for cough and shortness of breath.    Gastrointestinal: Negative for abdominal pain, heartburn, melena, nausea, and vomitting.    Genitourinary:  Negative bladder incontinence and dysuria.    Musculoskeletal:  See HPI    Neurological: Negative for numbness.    Psychiatric/Behavioral: Negative for depression.  The patient is not nervous/anxious.      Endocrine: Negative for polyuria    Hematologic/Lymphatic: Negative for bleeding problem.  Does  bruise/bleed easily.    Skin: Negative for poor would healing and rash      Physical Examination:    Vital Signs:    There were no vitals filed for this visit.    There is no height or weight on file to calculate BMI.    This a well-developed, well nourished patient in no acute distress.  They are alert and oriented and cooperative to examination.  Pt. walks without an antalgic gait.      Examination of the right knee shows no rashes or erythema. There are no masses ecchymosis or effusion. Patient has full range of motion from 0-130°. Patient is mildly tender to palpation over lateral joint line and nontender to palpation over the medial joint line.  Knee is stable to varus and valgus stress. 5 out of 5 motor strength. Palpable distal pulses. Intact light touch sensation. Negative Patellofemoral crepitus        X-rays: X-rays of the right knee are reviewed which show some very mild arthritic changes.  Some spurring underneath the patella.  Mild calcification within the lateral meniscus.  X-rays of the right ankle are reviewed which show severe flatfoot deformity with heel spurs both along the Achilles insertion and along the plantar fascia origin.   There is a small nondisplaced linear line through the tip of the medial malleolus with possible fracture     Assessment: Mild right knee arthritis  Right posterior tibial tendinitis versus nondisplaced medial malleolus fracture    Plan:   I injected her with Synvisc 2 of 3.  Follow up next week.    This note was created using voice recognition software that occasionally misinterpreted phrases or words.    Consult note is delivered via Epic messaging service.

## 2019-11-01 ENCOUNTER — HOSPITAL ENCOUNTER (OUTPATIENT)
Dept: RADIOLOGY | Facility: HOSPITAL | Age: 44
Discharge: HOME | End: 2019-11-01
Attending: OBSTETRICS & GYNECOLOGY
Payer: COMMERCIAL

## 2019-11-01 ENCOUNTER — OFFICE VISIT (OUTPATIENT)
Dept: ORTHOPEDICS | Facility: CLINIC | Age: 44
End: 2019-11-01
Payer: COMMERCIAL

## 2019-11-01 ENCOUNTER — NURSE TRIAGE (OUTPATIENT)
Dept: ADMINISTRATIVE | Facility: CLINIC | Age: 44
End: 2019-11-01

## 2019-11-01 VITALS
SYSTOLIC BLOOD PRESSURE: 148 MMHG | HEIGHT: 69 IN | WEIGHT: 293 LBS | BODY MASS INDEX: 43.4 KG/M2 | DIASTOLIC BLOOD PRESSURE: 72 MMHG | HEART RATE: 65 BPM

## 2019-11-01 DIAGNOSIS — Z12.31 VISIT FOR SCREENING MAMMOGRAM: ICD-10-CM

## 2019-11-01 DIAGNOSIS — M21.40 PES PLANUS, UNSPECIFIED LATERALITY: ICD-10-CM

## 2019-11-01 DIAGNOSIS — M25.571 ACUTE RIGHT ANKLE PAIN: Primary | ICD-10-CM

## 2019-11-01 PROCEDURE — 77067 MAMMO DIGITAL SCREENING BILAT WITH TOMOSYNTHESIS_CAD: ICD-10-PCS | Mod: 26,,, | Performed by: RADIOLOGY

## 2019-11-01 PROCEDURE — 77067 SCR MAMMO BI INCL CAD: CPT | Mod: TC

## 2019-11-01 PROCEDURE — 97760 ORTHOTIC MGMT&TRAING 1ST ENC: CPT | Mod: S$GLB,,, | Performed by: NURSE PRACTITIONER

## 2019-11-01 PROCEDURE — 77063 MAMMO DIGITAL SCREENING BILAT WITH TOMOSYNTHESIS_CAD: ICD-10-PCS | Mod: 26,,, | Performed by: RADIOLOGY

## 2019-11-01 PROCEDURE — 99999 PR PBB SHADOW E&M-EST. PATIENT-LVL III: CPT | Mod: PBBFAC,,, | Performed by: NURSE PRACTITIONER

## 2019-11-01 PROCEDURE — 99213 PR OFFICE/OUTPT VISIT, EST, LEVL III, 20-29 MIN: ICD-10-PCS | Mod: S$GLB,,, | Performed by: NURSE PRACTITIONER

## 2019-11-01 PROCEDURE — 97760 PR ORTHOTIC MGMT&TRAINJ INITIAL ENC EA 15 MINS: ICD-10-PCS | Mod: S$GLB,,, | Performed by: NURSE PRACTITIONER

## 2019-11-01 PROCEDURE — 99999 PR PBB SHADOW E&M-EST. PATIENT-LVL III: ICD-10-PCS | Mod: PBBFAC,,, | Performed by: NURSE PRACTITIONER

## 2019-11-01 PROCEDURE — 77067 SCR MAMMO BI INCL CAD: CPT | Mod: 26,,, | Performed by: RADIOLOGY

## 2019-11-01 PROCEDURE — 77063 BREAST TOMOSYNTHESIS BI: CPT | Mod: 26,,, | Performed by: RADIOLOGY

## 2019-11-01 PROCEDURE — 99213 OFFICE O/P EST LOW 20 MIN: CPT | Mod: S$GLB,,, | Performed by: NURSE PRACTITIONER

## 2019-11-01 RX ORDER — MELOXICAM 15 MG/1
15 TABLET ORAL DAILY
Qty: 30 TABLET | Refills: 1 | Status: SHIPPED | OUTPATIENT
Start: 2019-11-01 | End: 2019-11-26 | Stop reason: ALTCHOICE

## 2019-11-01 NOTE — PATIENT INSTRUCTIONS

## 2019-11-01 NOTE — TELEPHONE ENCOUNTER
Foot pain began 4 weeks ago, xray done and shows non displaced fracture.   She was already seeing Dr. Nunn, orthopedist, for her knee, they have discussed the foot several times, and he advised  her to change shoes/wear good shoes.  Her pain is not getting better, today she rates her pain 8-9/10, and is having a difficult time walking. She has been taking Aleve, icing the foot, resting and elevating as much as possible, and wearing good shoes, but it continues to be very painful.  Would like to be seen today.    Reason for Disposition   Patient wants to be seen    Additional Information   Negative: Sounds like a life-threatening emergency to the triager   Negative: Information only call about a Well Adult (no illness or injury)   Negative: Nursing judgment   Negative: Nursing judgment   Negative: Nursing judgment   Negative: Nursing judgment   Negative: Nursing judgment   Negative: Nursing judgment    Protocols used: NO PROTOCOL AVAILABLE - SICK ADULT-A-[place]
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
## 2019-11-07 ENCOUNTER — OFFICE VISIT (OUTPATIENT)
Dept: ORTHOPEDICS | Facility: CLINIC | Age: 44
End: 2019-11-07
Payer: COMMERCIAL

## 2019-11-07 VITALS — WEIGHT: 293 LBS | HEIGHT: 69 IN | BODY MASS INDEX: 43.4 KG/M2

## 2019-11-07 DIAGNOSIS — M17.11 PRIMARY OSTEOARTHRITIS OF RIGHT KNEE: Primary | ICD-10-CM

## 2019-11-07 PROCEDURE — 20610 DRAIN/INJ JOINT/BURSA W/O US: CPT | Mod: RT,S$GLB,, | Performed by: ORTHOPAEDIC SURGERY

## 2019-11-07 PROCEDURE — 99499 NO LOS: ICD-10-PCS | Mod: S$GLB,,, | Performed by: ORTHOPAEDIC SURGERY

## 2019-11-07 PROCEDURE — 99999 PR PBB SHADOW E&M-EST. PATIENT-LVL II: CPT | Mod: PBBFAC,,, | Performed by: ORTHOPAEDIC SURGERY

## 2019-11-07 PROCEDURE — 20610 LARGE JOINT ASPIRATION/INJECTION: R KNEE: ICD-10-PCS | Mod: RT,S$GLB,, | Performed by: ORTHOPAEDIC SURGERY

## 2019-11-07 PROCEDURE — 99499 UNLISTED E&M SERVICE: CPT | Mod: S$GLB,,, | Performed by: ORTHOPAEDIC SURGERY

## 2019-11-07 PROCEDURE — 99999 PR PBB SHADOW E&M-EST. PATIENT-LVL II: ICD-10-PCS | Mod: PBBFAC,,, | Performed by: ORTHOPAEDIC SURGERY

## 2019-11-07 NOTE — PROCEDURES
Large Joint Aspiration/Injection: R knee  Date/Time: 11/7/2019 10:15 AM  Performed by: Zackary Nunn MD  Authorized by: Zackary Nunn MD     Consent Done?:  Yes (Verbal)  Indications:  Pain  Procedure site marked: Yes    Timeout: Prior to procedure the correct patient, procedure, and site was verified      Location:  Knee  Site:  R knee  Prep: Patient was prepped and draped in usual sterile fashion    Needle size:  20 G  Approach:  Anterolateral  Medications:  16 mg hyaluronate 16 mg/2 mL  Patient tolerance:  Patient tolerated the procedure well with no immediate complications

## 2019-11-26 ENCOUNTER — OFFICE VISIT (OUTPATIENT)
Dept: ORTHOPEDICS | Facility: CLINIC | Age: 44
End: 2019-11-26
Payer: COMMERCIAL

## 2019-11-26 VITALS
BODY MASS INDEX: 43.4 KG/M2 | SYSTOLIC BLOOD PRESSURE: 174 MMHG | HEIGHT: 69 IN | WEIGHT: 293 LBS | DIASTOLIC BLOOD PRESSURE: 95 MMHG | HEART RATE: 73 BPM

## 2019-11-26 DIAGNOSIS — M72.2 PLANTAR FASCIITIS OF RIGHT FOOT: Primary | ICD-10-CM

## 2019-11-26 DIAGNOSIS — M25.571 ACUTE RIGHT ANKLE PAIN: Primary | ICD-10-CM

## 2019-11-26 PROCEDURE — 99999 PR PBB SHADOW E&M-EST. PATIENT-LVL III: ICD-10-PCS | Mod: PBBFAC,,, | Performed by: ORTHOPAEDIC SURGERY

## 2019-11-26 PROCEDURE — 99242 OFF/OP CONSLTJ NEW/EST SF 20: CPT | Mod: 25,S$GLB,, | Performed by: ORTHOPAEDIC SURGERY

## 2019-11-26 PROCEDURE — 99999 PR PBB SHADOW E&M-EST. PATIENT-LVL III: CPT | Mod: PBBFAC,,, | Performed by: ORTHOPAEDIC SURGERY

## 2019-11-26 PROCEDURE — 99242 PR OFFICE CONSULTATION,LEVEL II: ICD-10-PCS | Mod: 25,S$GLB,, | Performed by: ORTHOPAEDIC SURGERY

## 2019-11-26 PROCEDURE — 20551 TENDON ORIGIN: ICD-10-PCS | Mod: RT,S$GLB,, | Performed by: ORTHOPAEDIC SURGERY

## 2019-11-26 PROCEDURE — 20551 NJX 1 TENDON ORIGIN/INSJ: CPT | Mod: RT,S$GLB,, | Performed by: ORTHOPAEDIC SURGERY

## 2019-11-26 RX ORDER — IBUPROFEN 800 MG/1
800 TABLET ORAL 3 TIMES DAILY
Qty: 60 TABLET | Refills: 3 | Status: SHIPPED | OUTPATIENT
Start: 2019-11-26 | End: 2020-12-03

## 2019-11-26 RX ADMIN — TRIAMCINOLONE ACETONIDE 40 MG: 40 INJECTION, SUSPENSION INTRA-ARTICULAR; INTRAMUSCULAR at 09:11

## 2019-11-26 NOTE — PROCEDURES
Tendon Origin  Date/Time: 11/26/2019 9:00 AM  Performed by: Chente Durand MD  Authorized by: Chente Durand MD     Consent Done?:  Yes (Verbal)  Indications:  Pain  Site marked: the procedure site was marked    Location: right plantar fascia.  Prep: patient was prepped and draped in usual sterile fashion    Ultrasonic Guidance for Needle Placement?: No    Needle size:  22 G  Approach: plnatar.  Medications:  40 mg triamcinolone acetonide 40 mg/mL  Patient tolerance:  Patient tolerated the procedure well with no immediate complications

## 2019-11-26 NOTE — LETTER
November 27, 2019      Sofya Rossi, APRN  [address] [facility] [address] [place] [address]           [facility]  [address] [place] [address]  Phone: [phone]          Patient: Candie Krause   MR Number: [id number]   YOB: 1975   Date of Visit: 11/26/2019       Dear Sofya Rossi:    Thank you for referring Candie Krause to me for evaluation. Attached you will find relevant portions of my assessment and plan of care.    If you have questions, please do not hesitate to call me. I look forward to following Candie Krause along with you.    Sincerely,    Chente Durand MD    Enclosure  CC:  No Recipients    If you would like to receive this communication electronically, please contact [email or web address] or [phone] to request more information on [facility] Link access.    For providers and/or their staff who would like to refer a patient to [facility], please contact us through our one-stop-shop provider referral line, [facility] Day, at [phone].    If you feel you have received this communication in error or would no longer like to receive these types of communications, please e-mail [email or web address]

## 2019-11-27 RX ORDER — TRIAMCINOLONE ACETONIDE 40 MG/ML
40 INJECTION, SUSPENSION INTRA-ARTICULAR; INTRAMUSCULAR
Status: DISCONTINUED | OUTPATIENT
Start: 2019-11-26 | End: 2019-11-27 | Stop reason: HOSPADM

## 2019-11-27 NOTE — PROGRESS NOTES
"CC: Heel pain     HPI: Candie Krause 44 y.o.  who was referred to me by Prisca Rossi NP and was seen in consultation today for c/o pain in the right heel which is burning/ aching in nature, 7/10 on the pain scale. The patient says the pain started about 6 weeks ago. No history of injury or trauma. Pt denies weakness, numbness, and tingling.  She saw Dr. Nunn and said "he didn't do anything".  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     PAST MEDICAL/SURGICAL/FAMILY/SOCIAL/ HISTORY: REVIEWED     ALLERGIES/MEDICATIONS: REVIEWED     Review of Systems:     Constitution: Negative.   HEENT: Negative.   Eyes: Negative.   Cardiovascular: Negative.   Respiratory: Negative.   Endocrine: Negative.   Hematologic/Lymphatic: Negative.   Skin: Negative.   Musculoskeletal: Positive for pain in right heel.   Gastrointestinal: Negative.   Genitourinary: Negative.   Neurological: Negative.   Psychiatric/Behavioral: Negative.   Allergic/Immunologic: Negative.     PHYSICAL EXAM:     Vitals:    11/26/19 0905   BP: (!) 174/95   Pulse: 73     Ht Readings from Last 1 Encounters:   11/26/19 5' 9" (1.753 m)     Wt Readings from Last 1 Encounters:   11/26/19 (!) 163.3 kg (360 lb)       GENERAL: Well developed, well nourished, no acute distress. Super Morbidly obese, BMI 54  SKIN: Skin is intact. No atrophy, abrasions or lesions are noted.   Neurological: Normal mental status. Appropriate and conversant. Alert and oriented x 3.  GAIT: Walks with mildly antalgic gait.    Right lower extremity compared with left lower extremity: 2+ dorsalis pedis pulse.  Limited exam due to body habitus. Pes pes planovalgus. 5/5 strength plantar flexion and dorsiflexion. Positive tenderness to palpation at the proximal medial insertion of plantar fascia. Sensation to light touch intact sural, saphenous, superficial peroneal and deep peroneal nerves.     XRAYS: 3 views of right ankle  obtained and reviewed today reveal  spurring at " the proximal medial insertion of plantar fascia on the os calcis. No evidence of fractures or dislocations.     ASSESSMENT: right foot plantar fasciitis     PLAN:  I spent 25 minutes in consulation with the patient today. More than half the time was spent counseling the patient on their condition.  I instructed the patient on stretching exercises. I gave them the [email or web address] webpage to order 3/4 inch inserts. I gave them a handout on plantar fasciitis.  I injected the right foot today. I wrote a prescription for custom molded orthotics with medial heel to toe wedge incorporated.  PT 2 times per week for 3 weeks.  We also discussed that her morbid obesity is causing these issues in her feet.  Call if symptoms persist and I will refer them to Dr. Blela for PRP vs Tenex.

## 2020-04-06 ENCOUNTER — DOCUMENTATION ONLY (OUTPATIENT)
Dept: REHABILITATION | Facility: HOSPITAL | Age: 45
End: 2020-04-06

## 2020-04-06 DIAGNOSIS — M47.22 OSTEOARTHRITIS OF SPINE WITH RADICULOPATHY, CERVICAL REGION: ICD-10-CM

## 2020-04-06 NOTE — PROGRESS NOTES
Outpatient Therapy Discharge Summary     Name: Candie Krause  [facility] Number: [id number]    Therapy Diagnosis:   Encounter Diagnosis   Name Primary?    Osteoarthritis of spine with radiculopathy, cervical region      Physician: Poncho Banks MD  Physician Orders: PT eval and treat  Medical Diagnosis: M47.22 (ICD-10-CM) - Osteoarthritis of spine with radiculopathy, cervical region  Evaluation Date: 7/31/17      Date of Last visit: 8/1/17  Total Visits Received: 2  Cancelled Visits: 0  No Show Visits: 0    Assessment    Goals: Not met secondary to non-compliance with POC      Discharge reason: Patient has not attended therapy since 8/1/17    Plan   This patient is discharged from Physical Therapy

## 2020-04-21 DIAGNOSIS — Z01.84 ANTIBODY RESPONSE EXAMINATION: ICD-10-CM

## 2020-04-23 ENCOUNTER — LAB VISIT (OUTPATIENT)
Dept: LAB | Facility: HOSPITAL | Age: 45
End: 2020-04-23
Attending: INTERNAL MEDICINE
Payer: COMMERCIAL

## 2020-04-23 DIAGNOSIS — Z01.84 ANTIBODY RESPONSE EXAMINATION: ICD-10-CM

## 2020-04-23 LAB — SARS-COV-2 IGG SERPL QL IA: NEGATIVE

## 2020-04-23 PROCEDURE — 36415 COLL VENOUS BLD VENIPUNCTURE: CPT

## 2020-04-23 PROCEDURE — 86769 SARS-COV-2 COVID-19 ANTIBODY: CPT

## 2020-05-05 ENCOUNTER — PATIENT MESSAGE (OUTPATIENT)
Dept: ADMINISTRATIVE | Facility: HOSPITAL | Age: 45
End: 2020-05-05

## 2020-09-29 ENCOUNTER — PATIENT MESSAGE (OUTPATIENT)
Dept: OTHER | Facility: OTHER | Age: 45
End: 2020-09-29

## 2020-12-03 ENCOUNTER — OFFICE VISIT (OUTPATIENT)
Dept: FAMILY MEDICINE | Facility: CLINIC | Age: 45
End: 2020-12-03
Payer: COMMERCIAL

## 2020-12-03 ENCOUNTER — TELEPHONE (OUTPATIENT)
Dept: FAMILY MEDICINE | Facility: CLINIC | Age: 45
End: 2020-12-03

## 2020-12-03 VITALS
DIASTOLIC BLOOD PRESSURE: 74 MMHG | TEMPERATURE: 99 F | HEART RATE: 71 BPM | BODY MASS INDEX: 43.4 KG/M2 | OXYGEN SATURATION: 97 % | SYSTOLIC BLOOD PRESSURE: 130 MMHG | WEIGHT: 293 LBS | HEIGHT: 69 IN

## 2020-12-03 DIAGNOSIS — E66.01 MORBID OBESITY DUE TO EXCESS CALORIES: ICD-10-CM

## 2020-12-03 DIAGNOSIS — Z87.2 HISTORY OF CELLULITIS: ICD-10-CM

## 2020-12-03 DIAGNOSIS — R60.0 LOCALIZED EDEMA: ICD-10-CM

## 2020-12-03 DIAGNOSIS — F32.1 CURRENT MODERATE EPISODE OF MAJOR DEPRESSIVE DISORDER WITHOUT PRIOR EPISODE: Primary | ICD-10-CM

## 2020-12-03 DIAGNOSIS — I10 ESSENTIAL HYPERTENSION: ICD-10-CM

## 2020-12-03 PROBLEM — M72.2 PLANTAR FASCIITIS OF RIGHT FOOT: Status: RESOLVED | Noted: 2019-11-26 | Resolved: 2020-12-03

## 2020-12-03 PROCEDURE — 3008F PR BODY MASS INDEX (BMI) DOCUMENTED: ICD-10-PCS | Mod: S$GLB,,, | Performed by: STUDENT IN AN ORGANIZED HEALTH CARE EDUCATION/TRAINING PROGRAM

## 2020-12-03 PROCEDURE — 3008F BODY MASS INDEX DOCD: CPT | Mod: S$GLB,,, | Performed by: STUDENT IN AN ORGANIZED HEALTH CARE EDUCATION/TRAINING PROGRAM

## 2020-12-03 PROCEDURE — 3078F DIAST BP <80 MM HG: CPT | Mod: S$GLB,,, | Performed by: STUDENT IN AN ORGANIZED HEALTH CARE EDUCATION/TRAINING PROGRAM

## 2020-12-03 PROCEDURE — 1126F AMNT PAIN NOTED NONE PRSNT: CPT | Mod: S$GLB,,, | Performed by: STUDENT IN AN ORGANIZED HEALTH CARE EDUCATION/TRAINING PROGRAM

## 2020-12-03 PROCEDURE — 99214 OFFICE O/P EST MOD 30 MIN: CPT | Mod: S$GLB,,, | Performed by: STUDENT IN AN ORGANIZED HEALTH CARE EDUCATION/TRAINING PROGRAM

## 2020-12-03 PROCEDURE — 3075F PR MOST RECENT SYSTOLIC BLOOD PRESS GE 130-139MM HG: ICD-10-PCS | Mod: S$GLB,,, | Performed by: STUDENT IN AN ORGANIZED HEALTH CARE EDUCATION/TRAINING PROGRAM

## 2020-12-03 PROCEDURE — 1126F PR PAIN SEVERITY QUANTIFIED, NO PAIN PRESENT: ICD-10-PCS | Mod: S$GLB,,, | Performed by: STUDENT IN AN ORGANIZED HEALTH CARE EDUCATION/TRAINING PROGRAM

## 2020-12-03 PROCEDURE — 99214 PR OFFICE/OUTPT VISIT, EST, LEVL IV, 30-39 MIN: ICD-10-PCS | Mod: S$GLB,,, | Performed by: STUDENT IN AN ORGANIZED HEALTH CARE EDUCATION/TRAINING PROGRAM

## 2020-12-03 PROCEDURE — 3078F PR MOST RECENT DIASTOLIC BLOOD PRESSURE < 80 MM HG: ICD-10-PCS | Mod: S$GLB,,, | Performed by: STUDENT IN AN ORGANIZED HEALTH CARE EDUCATION/TRAINING PROGRAM

## 2020-12-03 PROCEDURE — 3075F SYST BP GE 130 - 139MM HG: CPT | Mod: S$GLB,,, | Performed by: STUDENT IN AN ORGANIZED HEALTH CARE EDUCATION/TRAINING PROGRAM

## 2020-12-03 RX ORDER — IBUPROFEN 800 MG/1
800 TABLET ORAL 3 TIMES DAILY
Qty: 60 TABLET | Refills: 3 | Status: CANCELLED | OUTPATIENT
Start: 2020-12-03

## 2020-12-03 RX ORDER — SPIRONOLACTONE 50 MG/1
50 TABLET, FILM COATED ORAL DAILY
Qty: 90 TABLET | Refills: 0 | Status: SHIPPED | OUTPATIENT
Start: 2020-12-03 | End: 2021-02-18

## 2020-12-03 RX ORDER — SULFAMETHOXAZOLE AND TRIMETHOPRIM 800; 160 MG/1; MG/1
1 TABLET ORAL 2 TIMES DAILY
Qty: 14 TABLET | Refills: 0 | Status: SHIPPED | OUTPATIENT
Start: 2020-12-03 | End: 2021-05-03

## 2020-12-03 RX ORDER — SERTRALINE HYDROCHLORIDE 25 MG/1
25 TABLET, FILM COATED ORAL DAILY
Qty: 30 TABLET | Refills: 11 | Status: SHIPPED | OUTPATIENT
Start: 2020-12-03 | End: 2021-08-03

## 2020-12-03 NOTE — PATIENT INSTRUCTIONS

## 2020-12-03 NOTE — PROGRESS NOTES
Subjective:       Patient ID: Candie Krause is a 45 y.o. female.    Chief Complaint: Edema (legs (bilateral) hx of cellulitis. 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 )    Leg swelling and redness-  Pt has chronic leg edema for years.  She has been on spironolactone for her edema and pcos in the past with good results.  She has not been on this medication and edema has worsened.  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  She also is not reliably wearing her compression hose.  She has had cellulitis in the past on her leg and is fearful she will get again.  She has noted that her left lower extremity is slightly more erythematous than it usually is and over the last few days this has started to extend around the leg.  No pain, no drainage, no fever.     Depression-   She has never had depression before.  She is starting to have overwhelming feelings of fear and lack of concentration.  She is having nervous feelings, not able to control worry and trouble relaxing. She is irritable and can't slow her mind down.  She has little interest in things, feels down, sleeping too much, has no energy and overeats.  She notes she is a stress eater.  She has no SI/HI.  She would like to try medications.    Review of Systems   Constitutional: Positive for fatigue. Negative for activity change, appetite change and fever.   HENT: Negative for nasal congestion and sore throat.    Eyes: Negative for visual disturbance.   Respiratory: Negative for cough and shortness of breath.    Cardiovascular: Positive for leg swelling. Negative for chest pain.   Gastrointestinal: Negative for abdominal pain, constipation, diarrhea, nausea and vomiting.   Endocrine: Negative for cold intolerance, heat intolerance, polydipsia and polyuria.   Genitourinary: Negative for dysuria and hematuria.   Integumentary:  Positive for color change. Negative for rash and wound.   Neurological:  Negative for dizziness, syncope and headaches.   Psychiatric/Behavioral: Positive for decreased concentration and dysphoric mood (depression/anxiety). Negative for agitation, behavioral problems, self-injury, sleep disturbance and suicidal ideas. The patient is nervous/anxious.         +Depression         Objective:      Physical Exam  Vitals signs and nursing note reviewed.   Constitutional:       General: She is not in acute distress.     Appearance: Normal appearance. She is well-developed. She is obese. She is not ill-appearing.   Eyes:      Conjunctiva/sclera: Conjunctivae normal.      Pupils: Pupils are equal, round, and reactive to light.   Neck:      Musculoskeletal: Normal range of motion and neck supple.      Thyroid: No thyromegaly.      Vascular: No carotid bruit.   Cardiovascular:      Rate and Rhythm: Normal rate and regular rhythm.      Pulses: Normal pulses.      Heart sounds: Normal heart sounds. No murmur.   Pulmonary:      Effort: Pulmonary effort is normal. No respiratory distress.      Breath sounds: Normal breath sounds. No wheezing.   Abdominal:      General: Bowel sounds are normal. There is no distension.      Palpations: Abdomen is soft. There is no mass.      Tenderness: There is no abdominal tenderness.   Musculoskeletal: Normal range of motion.      Right lower leg: Edema (2+ to the knee) present.      Left lower leg: Edema (2+ to the knee) present.   Lymphadenopathy:      Cervical: No cervical adenopathy.   Skin:     General: Skin is warm and dry.      Findings: Erythema (slight erythema of anterior shin shifting medially. no wound) present. No lesion or rash.      Comments: Two small soft 2 cm knots on left waistline in skin fold.  Spontaneous purulent drainage. No erythema. No induration.   Neurological:      General: No focal deficit present.      Mental Status: She is alert and oriented to person, place, and time. Mental status is at baseline.      Gait: Gait normal.   Psychiatric:          Mood and Affect: Mood normal.         Behavior: Behavior normal.         Thought Content: Thought content normal.         Judgment: Judgment normal.         Assessment:       1. Current moderate episode of major depressive disorder without prior episode    2. Essential hypertension    3. Morbid obesity due to excess calories    4. Localized edema    5. History of cellulitis        Plan:       Problem List Items Addressed This Visit        Cardiac/Vascular    Essential hypertension    Relevant Medications    spironolactone (ALDACTONE) 50 MG tablet       Other    Current moderate episode of major depressive disorder without prior episode - Primary     Feeling sad and overwhelmed.  First time episode.  No si/hi or self harm.  She is sleeping well and eating well but cannot seem to handle workload and life without severe stress, anxiety and fear.   PHQ9 10- moderate depression.  Symptoms are lack of energy, sleeping too much and eating too much.  Little interest, feeling down, and trouble concentrating  MDQ- arguments, less sleep, racing thoughts. And family with bipolar. Negative screen.   GAD7- nervous, worry, trouble relaxing.  16 Severe anxiety.     Start Zoloft low dose and titrate up         Relevant Medications    sertraline (ZOLOFT) 25 MG tablet      Other Visit Diagnoses     Morbid obesity due to excess calories        Worsening edema.  Discussed diet and exercise.     Localized edema        Bilateral lower extremity edema after stopping spironolactone.  Restart her diuretic. SHe has erythema she is fearful will become cellulitis as it has before.    Relevant Medications    sulfamethoxazole-trimethoprim 800-160mg (BACTRIM DS) 800-160 mg Tab    History of cellulitis        Pocket script in case her erythema becomes cellulitis over the upcoming weekend.     Relevant Medications    sulfamethoxazole-trimethoprim 800-160mg (BACTRIM DS) 800-160 mg Tab           New patient with new problems  Medication  management- moderate risk  Moderate MdM

## 2020-12-03 NOTE — ASSESSMENT & PLAN NOTE
Feeling sad and overwhelmed.  First time episode.  No si/hi or self harm.  She is sleeping well and eating well but cannot seem to handle workload and life without severe stress, anxiety and fear.   PHQ9 10- moderate depression.  Symptoms are lack of energy, sleeping too much and eating too much.  Little interest, feeling down, and trouble concentrating  MDQ- arguments, less sleep, racing thoughts. And family with bipolar. Negative screen.   GAD7- nervous, worry, trouble relaxing.  16 Severe anxiety.     Start Zoloft low dose and titrate up

## 2020-12-11 ENCOUNTER — PATIENT MESSAGE (OUTPATIENT)
Dept: FAMILY MEDICINE | Facility: CLINIC | Age: 45
End: 2020-12-11

## 2020-12-11 NOTE — LETTER
December 11, 2020             [address]  [place] [address]  Phone: [phone]  Fax: [phone]   Patient: Candie Krause   MR Number: [id number]   YOB: 1975   Date of Visit: 12/11/2020     To whom it may concern:    Mrs. Krause was seen in my clinic on December 3rd 2020.    She is overall healthy and stable and doing very well.    I see no reason she should not be able to conduct her job duties as required.    If you have questions, please do not hesitate to call me.     Sincerely,        Nunu Costello MD

## 2020-12-23 ENCOUNTER — PATIENT MESSAGE (OUTPATIENT)
Dept: FAMILY MEDICINE | Facility: CLINIC | Age: 45
End: 2020-12-23

## 2020-12-23 DIAGNOSIS — E55.9 VITAMIN D DEFICIENCY DISEASE: Primary | ICD-10-CM

## 2020-12-24 RX ORDER — ERGOCALCIFEROL 1.25 MG/1
50000 CAPSULE ORAL
Qty: 12 CAPSULE | Refills: 2 | Status: SHIPPED | OUTPATIENT
Start: 2020-12-24 | End: 2021-08-04 | Stop reason: SDUPTHER

## 2021-04-19 ENCOUNTER — PATIENT MESSAGE (OUTPATIENT)
Dept: FAMILY MEDICINE | Facility: CLINIC | Age: 46
End: 2021-04-19

## 2021-05-03 ENCOUNTER — OFFICE VISIT (OUTPATIENT)
Dept: FAMILY MEDICINE | Facility: CLINIC | Age: 46
End: 2021-05-03
Payer: COMMERCIAL

## 2021-05-03 ENCOUNTER — APPOINTMENT (OUTPATIENT)
Dept: LAB | Facility: HOSPITAL | Age: 46
End: 2021-05-03
Attending: STUDENT IN AN ORGANIZED HEALTH CARE EDUCATION/TRAINING PROGRAM
Payer: COMMERCIAL

## 2021-05-03 ENCOUNTER — LAB VISIT (OUTPATIENT)
Dept: LAB | Facility: CLINIC | Age: 46
End: 2021-05-03
Payer: COMMERCIAL

## 2021-05-03 VITALS
SYSTOLIC BLOOD PRESSURE: 121 MMHG | OXYGEN SATURATION: 96 % | HEART RATE: 66 BPM | DIASTOLIC BLOOD PRESSURE: 76 MMHG | BODY MASS INDEX: 43.4 KG/M2 | TEMPERATURE: 98 F | HEIGHT: 69 IN | WEIGHT: 293 LBS

## 2021-05-03 DIAGNOSIS — Z00.8 ENCOUNTER FOR WORK CAPABILITY ASSESSMENT: ICD-10-CM

## 2021-05-03 DIAGNOSIS — Z11.4 SCREENING FOR HIV (HUMAN IMMUNODEFICIENCY VIRUS): ICD-10-CM

## 2021-05-03 DIAGNOSIS — E78.5 DYSLIPIDEMIA: ICD-10-CM

## 2021-05-03 DIAGNOSIS — Z78.0 MENOPAUSE: ICD-10-CM

## 2021-05-03 DIAGNOSIS — F32.1 CURRENT MODERATE EPISODE OF MAJOR DEPRESSIVE DISORDER WITHOUT PRIOR EPISODE: ICD-10-CM

## 2021-05-03 DIAGNOSIS — I10 ESSENTIAL HYPERTENSION: Primary | ICD-10-CM

## 2021-05-03 DIAGNOSIS — Z11.59 ENCOUNTER FOR HEPATITIS C SCREENING TEST FOR LOW RISK PATIENT: ICD-10-CM

## 2021-05-03 DIAGNOSIS — R73.09 BLOOD GLUCOSE ABNORMAL: ICD-10-CM

## 2021-05-03 DIAGNOSIS — E55.9 VITAMIN D DEFICIENCY DISEASE: ICD-10-CM

## 2021-05-03 DIAGNOSIS — I10 ESSENTIAL HYPERTENSION: ICD-10-CM

## 2021-05-03 LAB
ALBUMIN SERPL BCP-MCNC: 3.6 G/DL (ref 3.5–5.2)
ALP SERPL-CCNC: 70 U/L (ref 55–135)
ALT SERPL W/O P-5'-P-CCNC: 32 U/L (ref 10–44)
ANION GAP SERPL CALC-SCNC: 9 MMOL/L (ref 8–16)
AST SERPL-CCNC: 26 U/L (ref 10–40)
BASOPHILS # BLD AUTO: 0.06 K/UL (ref 0–0.2)
BASOPHILS NFR BLD: 0.7 % (ref 0–1.9)
BILIRUB SERPL-MCNC: 0.4 MG/DL (ref 0.1–1)
BUN SERPL-MCNC: 19 MG/DL (ref 6–20)
CALCIUM SERPL-MCNC: 9.7 MG/DL (ref 8.7–10.5)
CHLORIDE SERPL-SCNC: 103 MMOL/L (ref 95–110)
CHOLEST SERPL-MCNC: 175 MG/DL (ref 120–199)
CHOLEST/HDLC SERPL: 4 {RATIO} (ref 2–5)
CO2 SERPL-SCNC: 26 MMOL/L (ref 23–29)
CREAT SERPL-MCNC: 0.9 MG/DL (ref 0.5–1.4)
DIFFERENTIAL METHOD: ABNORMAL
EOSINOPHIL # BLD AUTO: 0.2 K/UL (ref 0–0.5)
EOSINOPHIL NFR BLD: 2.8 % (ref 0–8)
ERYTHROCYTE [DISTWIDTH] IN BLOOD BY AUTOMATED COUNT: 13.7 % (ref 11.5–14.5)
EST. GFR  (AFRICAN AMERICAN): >60 ML/MIN/1.73 M^2
EST. GFR  (NON AFRICAN AMERICAN): >60 ML/MIN/1.73 M^2
GLUCOSE SERPL-MCNC: 112 MG/DL (ref 70–110)
HCT VFR BLD AUTO: 41.9 % (ref 37–48.5)
HDLC SERPL-MCNC: 44 MG/DL (ref 40–75)
HDLC SERPL: 25.1 % (ref 20–50)
HGB BLD-MCNC: 13.3 G/DL (ref 12–16)
IMM GRANULOCYTES # BLD AUTO: 0.03 K/UL (ref 0–0.04)
IMM GRANULOCYTES NFR BLD AUTO: 0.4 % (ref 0–0.5)
LDLC SERPL CALC-MCNC: 98.8 MG/DL (ref 63–159)
LYMPHOCYTES # BLD AUTO: 2.2 K/UL (ref 1–4.8)
LYMPHOCYTES NFR BLD: 25.4 % (ref 18–48)
MCH RBC QN AUTO: 29.5 PG (ref 27–31)
MCHC RBC AUTO-ENTMCNC: 31.7 G/DL (ref 32–36)
MCV RBC AUTO: 93 FL (ref 82–98)
MONOCYTES # BLD AUTO: 0.6 K/UL (ref 0.3–1)
MONOCYTES NFR BLD: 7.2 % (ref 4–15)
NEUTROPHILS # BLD AUTO: 5.4 K/UL (ref 1.8–7.7)
NEUTROPHILS NFR BLD: 63.5 % (ref 38–73)
NONHDLC SERPL-MCNC: 131 MG/DL
NRBC BLD-RTO: 0 /100 WBC
PLATELET # BLD AUTO: 244 K/UL (ref 150–450)
PMV BLD AUTO: 9.2 FL (ref 9.2–12.9)
POTASSIUM SERPL-SCNC: 4.2 MMOL/L (ref 3.5–5.1)
PROT SERPL-MCNC: 7.5 G/DL (ref 6–8.4)
RBC # BLD AUTO: 4.51 M/UL (ref 4–5.4)
SODIUM SERPL-SCNC: 138 MMOL/L (ref 136–145)
TRIGL SERPL-MCNC: 161 MG/DL (ref 30–150)
WBC # BLD AUTO: 8.57 K/UL (ref 3.9–12.7)

## 2021-05-03 PROCEDURE — 86480 TB TEST CELL IMMUN MEASURE: CPT | Performed by: STUDENT IN AN ORGANIZED HEALTH CARE EDUCATION/TRAINING PROGRAM

## 2021-05-03 PROCEDURE — 82306 VITAMIN D 25 HYDROXY: CPT | Performed by: STUDENT IN AN ORGANIZED HEALTH CARE EDUCATION/TRAINING PROGRAM

## 2021-05-03 PROCEDURE — 86803 HEPATITIS C AB TEST: CPT | Performed by: STUDENT IN AN ORGANIZED HEALTH CARE EDUCATION/TRAINING PROGRAM

## 2021-05-03 PROCEDURE — 36415 PR COLLECTION VENOUS BLOOD,VENIPUNCTURE: ICD-10-PCS | Mod: ,,, | Performed by: STUDENT IN AN ORGANIZED HEALTH CARE EDUCATION/TRAINING PROGRAM

## 2021-05-03 PROCEDURE — 86787 VARICELLA-ZOSTER ANTIBODY: CPT | Performed by: STUDENT IN AN ORGANIZED HEALTH CARE EDUCATION/TRAINING PROGRAM

## 2021-05-03 PROCEDURE — 1126F PR PAIN SEVERITY QUANTIFIED, NO PAIN PRESENT: ICD-10-PCS | Mod: S$GLB,,, | Performed by: STUDENT IN AN ORGANIZED HEALTH CARE EDUCATION/TRAINING PROGRAM

## 2021-05-03 PROCEDURE — 85025 COMPLETE CBC W/AUTO DIFF WBC: CPT | Performed by: STUDENT IN AN ORGANIZED HEALTH CARE EDUCATION/TRAINING PROGRAM

## 2021-05-03 PROCEDURE — 80053 COMPREHEN METABOLIC PANEL: CPT | Performed by: STUDENT IN AN ORGANIZED HEALTH CARE EDUCATION/TRAINING PROGRAM

## 2021-05-03 PROCEDURE — 83036 HEMOGLOBIN GLYCOSYLATED A1C: CPT | Performed by: STUDENT IN AN ORGANIZED HEALTH CARE EDUCATION/TRAINING PROGRAM

## 2021-05-03 PROCEDURE — 1126F AMNT PAIN NOTED NONE PRSNT: CPT | Mod: S$GLB,,, | Performed by: STUDENT IN AN ORGANIZED HEALTH CARE EDUCATION/TRAINING PROGRAM

## 2021-05-03 PROCEDURE — 3008F BODY MASS INDEX DOCD: CPT | Mod: S$GLB,,, | Performed by: STUDENT IN AN ORGANIZED HEALTH CARE EDUCATION/TRAINING PROGRAM

## 2021-05-03 PROCEDURE — 80061 LIPID PANEL: CPT | Performed by: STUDENT IN AN ORGANIZED HEALTH CARE EDUCATION/TRAINING PROGRAM

## 2021-05-03 PROCEDURE — 99214 OFFICE O/P EST MOD 30 MIN: CPT | Mod: S$GLB,,, | Performed by: STUDENT IN AN ORGANIZED HEALTH CARE EDUCATION/TRAINING PROGRAM

## 2021-05-03 PROCEDURE — 36415 COLL VENOUS BLD VENIPUNCTURE: CPT | Mod: ,,, | Performed by: STUDENT IN AN ORGANIZED HEALTH CARE EDUCATION/TRAINING PROGRAM

## 2021-05-03 PROCEDURE — 99214 PR OFFICE/OUTPT VISIT, EST, LEVL IV, 30-39 MIN: ICD-10-PCS | Mod: S$GLB,,, | Performed by: STUDENT IN AN ORGANIZED HEALTH CARE EDUCATION/TRAINING PROGRAM

## 2021-05-03 PROCEDURE — 86703 HIV-1/HIV-2 1 RESULT ANTBDY: CPT | Performed by: STUDENT IN AN ORGANIZED HEALTH CARE EDUCATION/TRAINING PROGRAM

## 2021-05-03 PROCEDURE — 3008F PR BODY MASS INDEX (BMI) DOCUMENTED: ICD-10-PCS | Mod: S$GLB,,, | Performed by: STUDENT IN AN ORGANIZED HEALTH CARE EDUCATION/TRAINING PROGRAM

## 2021-05-04 ENCOUNTER — PATIENT MESSAGE (OUTPATIENT)
Dept: FAMILY MEDICINE | Facility: CLINIC | Age: 46
End: 2021-05-04

## 2021-05-04 LAB
25(OH)D3+25(OH)D2 SERPL-MCNC: 24 NG/ML (ref 30–96)
ESTIMATED AVG GLUCOSE: 114 MG/DL (ref 68–131)
HBA1C MFR BLD: 5.6 % (ref 4–5.6)
HCV AB SERPL QL IA: NEGATIVE
HIV 1+2 AB+HIV1 P24 AG SERPL QL IA: NEGATIVE

## 2021-05-05 DIAGNOSIS — Z12.31 OTHER SCREENING MAMMOGRAM: ICD-10-CM

## 2021-05-05 LAB
GAMMA INTERFERON BACKGROUND BLD IA-ACNC: 0.03 IU/ML
M TB IFN-G CD4+ BCKGRND COR BLD-ACNC: 0.01 IU/ML
MITOGEN IGNF BCKGRD COR BLD-ACNC: >10 IU/ML
TB GOLD PLUS: NEGATIVE
TB2 - NIL: 0 IU/ML
VARICELLA INTERPRETATION: POSITIVE
VARICELLA ZOSTER IGG: 4.72 ISR (ref 0–0.9)

## 2021-07-07 ENCOUNTER — PATIENT MESSAGE (OUTPATIENT)
Dept: ADMINISTRATIVE | Facility: HOSPITAL | Age: 46
End: 2021-07-07

## 2021-07-21 ENCOUNTER — PATIENT OUTREACH (OUTPATIENT)
Dept: ADMINISTRATIVE | Facility: HOSPITAL | Age: 46
End: 2021-07-21

## 2021-07-28 ENCOUNTER — PATIENT OUTREACH (OUTPATIENT)
Dept: ADMINISTRATIVE | Facility: HOSPITAL | Age: 46
End: 2021-07-28

## 2021-08-03 DIAGNOSIS — I10 ESSENTIAL HYPERTENSION: ICD-10-CM

## 2021-08-03 DIAGNOSIS — E55.9 VITAMIN D DEFICIENCY DISEASE: ICD-10-CM

## 2021-08-03 DIAGNOSIS — F32.1 CURRENT MODERATE EPISODE OF MAJOR DEPRESSIVE DISORDER WITHOUT PRIOR EPISODE: ICD-10-CM

## 2021-08-03 RX ORDER — SERTRALINE HYDROCHLORIDE 25 MG/1
25 TABLET, FILM COATED ORAL DAILY
Qty: 90 TABLET | Refills: 3 | Status: SHIPPED | OUTPATIENT
Start: 2021-08-03 | End: 2021-12-21

## 2021-08-03 RX ORDER — SPIRONOLACTONE 50 MG/1
50 TABLET, FILM COATED ORAL DAILY
Qty: 90 TABLET | Refills: 3 | Status: SHIPPED | OUTPATIENT
Start: 2021-08-03 | End: 2022-02-01 | Stop reason: SDUPTHER

## 2021-08-04 RX ORDER — ERGOCALCIFEROL 1.25 MG/1
50000 CAPSULE ORAL
Qty: 12 CAPSULE | Refills: 2 | Status: SHIPPED | OUTPATIENT
Start: 2021-08-04 | End: 2022-04-13

## 2021-10-04 ENCOUNTER — OFFICE VISIT (OUTPATIENT)
Dept: FAMILY MEDICINE | Facility: CLINIC | Age: 46
End: 2021-10-04
Payer: COMMERCIAL

## 2021-10-04 VITALS
WEIGHT: 293 LBS | HEART RATE: 66 BPM | OXYGEN SATURATION: 96 % | TEMPERATURE: 98 F | DIASTOLIC BLOOD PRESSURE: 98 MMHG | BODY MASS INDEX: 43.4 KG/M2 | SYSTOLIC BLOOD PRESSURE: 142 MMHG | HEIGHT: 69 IN

## 2021-10-04 DIAGNOSIS — J40 BRONCHITIS: ICD-10-CM

## 2021-10-04 DIAGNOSIS — F32.1 CURRENT MODERATE EPISODE OF MAJOR DEPRESSIVE DISORDER WITHOUT PRIOR EPISODE: ICD-10-CM

## 2021-10-04 DIAGNOSIS — R09.81 NASAL CONGESTION: ICD-10-CM

## 2021-10-04 DIAGNOSIS — I10 ESSENTIAL HYPERTENSION: Primary | ICD-10-CM

## 2021-10-04 DIAGNOSIS — R21 RASH: ICD-10-CM

## 2021-10-04 DIAGNOSIS — E78.5 DYSLIPIDEMIA: ICD-10-CM

## 2021-10-04 DIAGNOSIS — E55.9 VITAMIN D DEFICIENCY DISEASE: ICD-10-CM

## 2021-10-04 PROCEDURE — 3008F PR BODY MASS INDEX (BMI) DOCUMENTED: ICD-10-PCS | Mod: S$GLB,,, | Performed by: STUDENT IN AN ORGANIZED HEALTH CARE EDUCATION/TRAINING PROGRAM

## 2021-10-04 PROCEDURE — 3080F PR MOST RECENT DIASTOLIC BLOOD PRESSURE >= 90 MM HG: ICD-10-PCS | Mod: S$GLB,,, | Performed by: STUDENT IN AN ORGANIZED HEALTH CARE EDUCATION/TRAINING PROGRAM

## 2021-10-04 PROCEDURE — 1160F PR REVIEW ALL MEDS BY PRESCRIBER/CLIN PHARMACIST DOCUMENTED: ICD-10-PCS | Mod: S$GLB,,, | Performed by: STUDENT IN AN ORGANIZED HEALTH CARE EDUCATION/TRAINING PROGRAM

## 2021-10-04 PROCEDURE — 3077F SYST BP >= 140 MM HG: CPT | Mod: S$GLB,,, | Performed by: STUDENT IN AN ORGANIZED HEALTH CARE EDUCATION/TRAINING PROGRAM

## 2021-10-04 PROCEDURE — 1160F RVW MEDS BY RX/DR IN RCRD: CPT | Mod: S$GLB,,, | Performed by: STUDENT IN AN ORGANIZED HEALTH CARE EDUCATION/TRAINING PROGRAM

## 2021-10-04 PROCEDURE — 1159F MED LIST DOCD IN RCRD: CPT | Mod: S$GLB,,, | Performed by: STUDENT IN AN ORGANIZED HEALTH CARE EDUCATION/TRAINING PROGRAM

## 2021-10-04 PROCEDURE — 99214 OFFICE O/P EST MOD 30 MIN: CPT | Mod: S$GLB,,, | Performed by: STUDENT IN AN ORGANIZED HEALTH CARE EDUCATION/TRAINING PROGRAM

## 2021-10-04 PROCEDURE — 3077F PR MOST RECENT SYSTOLIC BLOOD PRESSURE >= 140 MM HG: ICD-10-PCS | Mod: S$GLB,,, | Performed by: STUDENT IN AN ORGANIZED HEALTH CARE EDUCATION/TRAINING PROGRAM

## 2021-10-04 PROCEDURE — 1159F PR MEDICATION LIST DOCUMENTED IN MEDICAL RECORD: ICD-10-PCS | Mod: S$GLB,,, | Performed by: STUDENT IN AN ORGANIZED HEALTH CARE EDUCATION/TRAINING PROGRAM

## 2021-10-04 PROCEDURE — 99214 PR OFFICE/OUTPT VISIT, EST, LEVL IV, 30-39 MIN: ICD-10-PCS | Mod: S$GLB,,, | Performed by: STUDENT IN AN ORGANIZED HEALTH CARE EDUCATION/TRAINING PROGRAM

## 2021-10-04 PROCEDURE — 3044F PR MOST RECENT HEMOGLOBIN A1C LEVEL <7.0%: ICD-10-PCS | Mod: S$GLB,,, | Performed by: STUDENT IN AN ORGANIZED HEALTH CARE EDUCATION/TRAINING PROGRAM

## 2021-10-04 PROCEDURE — 3080F DIAST BP >= 90 MM HG: CPT | Mod: S$GLB,,, | Performed by: STUDENT IN AN ORGANIZED HEALTH CARE EDUCATION/TRAINING PROGRAM

## 2021-10-04 PROCEDURE — 3044F HG A1C LEVEL LT 7.0%: CPT | Mod: S$GLB,,, | Performed by: STUDENT IN AN ORGANIZED HEALTH CARE EDUCATION/TRAINING PROGRAM

## 2021-10-04 PROCEDURE — 3008F BODY MASS INDEX DOCD: CPT | Mod: S$GLB,,, | Performed by: STUDENT IN AN ORGANIZED HEALTH CARE EDUCATION/TRAINING PROGRAM

## 2021-10-04 RX ORDER — MELOXICAM 15 MG/1
1 TABLET ORAL DAILY
COMMUNITY

## 2021-10-04 RX ORDER — FLUTICASONE PROPIONATE 50 MCG
1 SPRAY, SUSPENSION (ML) NASAL DAILY
Qty: 16 G | Refills: 0 | Status: SHIPPED | OUTPATIENT
Start: 2021-10-04 | End: 2021-10-27

## 2021-10-04 RX ORDER — TRIAMCINOLONE ACETONIDE 5 MG/G
CREAM TOPICAL 2 TIMES DAILY
Qty: 454 G | Refills: 0 | Status: SHIPPED | OUTPATIENT
Start: 2021-10-04 | End: 2023-11-20

## 2021-10-04 RX ORDER — CETIRIZINE HYDROCHLORIDE 10 MG/1
10 TABLET ORAL DAILY
Qty: 30 TABLET | Refills: 1 | Status: SHIPPED | OUTPATIENT
Start: 2021-10-04 | End: 2021-10-04

## 2021-10-04 RX ORDER — CETIRIZINE HYDROCHLORIDE, PSEUDOEPHEDRINE HYDROCHLORIDE 5; 120 MG/1; MG/1
1 TABLET, FILM COATED, EXTENDED RELEASE ORAL EVERY 12 HOURS PRN
Qty: 20 TABLET | Refills: 0 | Status: SHIPPED | OUTPATIENT
Start: 2021-10-04 | End: 2021-10-14

## 2021-10-04 RX ORDER — BENZONATATE 100 MG/1
100 CAPSULE ORAL 3 TIMES DAILY PRN
Qty: 30 CAPSULE | Refills: 0 | Status: SHIPPED | OUTPATIENT
Start: 2021-10-04 | End: 2021-10-14

## 2021-10-07 ENCOUNTER — PATIENT MESSAGE (OUTPATIENT)
Dept: ADMINISTRATIVE | Facility: HOSPITAL | Age: 46
End: 2021-10-07

## 2021-12-09 ENCOUNTER — PATIENT MESSAGE (OUTPATIENT)
Dept: FAMILY MEDICINE | Facility: CLINIC | Age: 46
End: 2021-12-09
Payer: COMMERCIAL

## 2021-12-10 ENCOUNTER — PATIENT MESSAGE (OUTPATIENT)
Dept: FAMILY MEDICINE | Facility: CLINIC | Age: 46
End: 2021-12-10
Payer: COMMERCIAL

## 2022-02-01 ENCOUNTER — PATIENT MESSAGE (OUTPATIENT)
Dept: FAMILY MEDICINE | Facility: CLINIC | Age: 47
End: 2022-02-01
Payer: COMMERCIAL

## 2022-02-01 DIAGNOSIS — F32.1 CURRENT MODERATE EPISODE OF MAJOR DEPRESSIVE DISORDER WITHOUT PRIOR EPISODE: ICD-10-CM

## 2022-02-01 DIAGNOSIS — I10 ESSENTIAL HYPERTENSION: ICD-10-CM

## 2022-02-01 RX ORDER — SPIRONOLACTONE 50 MG/1
50 TABLET, FILM COATED ORAL DAILY
Qty: 90 TABLET | Refills: 3 | Status: SHIPPED | OUTPATIENT
Start: 2022-02-01 | End: 2023-01-19

## 2022-02-01 RX ORDER — SERTRALINE HYDROCHLORIDE 50 MG/1
50 TABLET, FILM COATED ORAL DAILY
Qty: 90 TABLET | Refills: 3 | Status: SHIPPED | OUTPATIENT
Start: 2022-02-01 | End: 2023-11-20

## 2022-03-18 ENCOUNTER — PATIENT MESSAGE (OUTPATIENT)
Dept: ADMINISTRATIVE | Facility: HOSPITAL | Age: 47
End: 2022-03-18
Payer: COMMERCIAL

## 2022-04-06 ENCOUNTER — LAB VISIT (OUTPATIENT)
Dept: LAB | Facility: CLINIC | Age: 47
End: 2022-04-06
Payer: COMMERCIAL

## 2022-04-06 ENCOUNTER — TELEPHONE (OUTPATIENT)
Dept: FAMILY MEDICINE | Facility: CLINIC | Age: 47
End: 2022-04-06
Payer: COMMERCIAL

## 2022-04-06 ENCOUNTER — OFFICE VISIT (OUTPATIENT)
Dept: FAMILY MEDICINE | Facility: CLINIC | Age: 47
End: 2022-04-06
Payer: COMMERCIAL

## 2022-04-06 VITALS
RESPIRATION RATE: 18 BRPM | TEMPERATURE: 98 F | BODY MASS INDEX: 43.4 KG/M2 | WEIGHT: 293 LBS | HEART RATE: 72 BPM | OXYGEN SATURATION: 97 % | DIASTOLIC BLOOD PRESSURE: 84 MMHG | HEIGHT: 69 IN | SYSTOLIC BLOOD PRESSURE: 136 MMHG

## 2022-04-06 DIAGNOSIS — L84 CORN OR CALLUS: ICD-10-CM

## 2022-04-06 DIAGNOSIS — Z12.83 SCREENING FOR SKIN CANCER: ICD-10-CM

## 2022-04-06 DIAGNOSIS — E55.9 VITAMIN D DEFICIENCY DISEASE: ICD-10-CM

## 2022-04-06 DIAGNOSIS — E78.5 DYSLIPIDEMIA: ICD-10-CM

## 2022-04-06 DIAGNOSIS — R00.2 PALPITATIONS: ICD-10-CM

## 2022-04-06 DIAGNOSIS — I10 ESSENTIAL HYPERTENSION: Primary | ICD-10-CM

## 2022-04-06 DIAGNOSIS — Z78.0 MENOPAUSE: ICD-10-CM

## 2022-04-06 DIAGNOSIS — E66.01 MORBID OBESITY WITH BMI OF 50.0-59.9, ADULT: ICD-10-CM

## 2022-04-06 DIAGNOSIS — R73.9 HYPERGLYCEMIA: ICD-10-CM

## 2022-04-06 DIAGNOSIS — F32.1 CURRENT MODERATE EPISODE OF MAJOR DEPRESSIVE DISORDER WITHOUT PRIOR EPISODE: ICD-10-CM

## 2022-04-06 DIAGNOSIS — R06.09 DYSPNEA ON EXERTION: ICD-10-CM

## 2022-04-06 LAB
25(OH)D3+25(OH)D2 SERPL-MCNC: 21 NG/ML (ref 30–96)
ALBUMIN SERPL BCP-MCNC: 3.5 G/DL (ref 3.5–5.2)
ALP SERPL-CCNC: 68 U/L (ref 55–135)
ALT SERPL W/O P-5'-P-CCNC: 50 U/L (ref 10–44)
ANION GAP SERPL CALC-SCNC: 12 MMOL/L (ref 8–16)
AST SERPL-CCNC: 46 U/L (ref 10–40)
BASOPHILS # BLD AUTO: 0.09 K/UL (ref 0–0.2)
BASOPHILS NFR BLD: 0.9 % (ref 0–1.9)
BILIRUB SERPL-MCNC: 0.5 MG/DL (ref 0.1–1)
BUN SERPL-MCNC: 15 MG/DL (ref 6–20)
CALCIUM SERPL-MCNC: 9.6 MG/DL (ref 8.7–10.5)
CHLORIDE SERPL-SCNC: 102 MMOL/L (ref 95–110)
CHOLEST SERPL-MCNC: 184 MG/DL (ref 120–199)
CHOLEST/HDLC SERPL: 5.1 {RATIO} (ref 2–5)
CO2 SERPL-SCNC: 24 MMOL/L (ref 23–29)
CREAT SERPL-MCNC: 1 MG/DL (ref 0.5–1.4)
DIFFERENTIAL METHOD: ABNORMAL
EOSINOPHIL # BLD AUTO: 0.3 K/UL (ref 0–0.5)
EOSINOPHIL NFR BLD: 2.9 % (ref 0–8)
ERYTHROCYTE [DISTWIDTH] IN BLOOD BY AUTOMATED COUNT: 14.8 % (ref 11.5–14.5)
EST. GFR  (AFRICAN AMERICAN): >60 ML/MIN/1.73 M^2
EST. GFR  (NON AFRICAN AMERICAN): >60 ML/MIN/1.73 M^2
GLUCOSE SERPL-MCNC: 132 MG/DL (ref 70–110)
HCT VFR BLD AUTO: 44 % (ref 37–48.5)
HDLC SERPL-MCNC: 36 MG/DL (ref 40–75)
HDLC SERPL: 19.6 % (ref 20–50)
HGB BLD-MCNC: 14 G/DL (ref 12–16)
IMM GRANULOCYTES # BLD AUTO: 0.05 K/UL (ref 0–0.04)
IMM GRANULOCYTES NFR BLD AUTO: 0.5 % (ref 0–0.5)
LDLC SERPL CALC-MCNC: 108 MG/DL (ref 63–159)
LYMPHOCYTES # BLD AUTO: 2.9 K/UL (ref 1–4.8)
LYMPHOCYTES NFR BLD: 30.4 % (ref 18–48)
MCH RBC QN AUTO: 29.1 PG (ref 27–31)
MCHC RBC AUTO-ENTMCNC: 31.8 G/DL (ref 32–36)
MCV RBC AUTO: 92 FL (ref 82–98)
MONOCYTES # BLD AUTO: 0.8 K/UL (ref 0.3–1)
MONOCYTES NFR BLD: 8.2 % (ref 4–15)
NEUTROPHILS # BLD AUTO: 5.5 K/UL (ref 1.8–7.7)
NEUTROPHILS NFR BLD: 57.1 % (ref 38–73)
NONHDLC SERPL-MCNC: 148 MG/DL
NRBC BLD-RTO: 0 /100 WBC
PLATELET # BLD AUTO: 256 K/UL (ref 150–450)
PMV BLD AUTO: 9.6 FL (ref 9.2–12.9)
POTASSIUM SERPL-SCNC: 4.3 MMOL/L (ref 3.5–5.1)
PROT SERPL-MCNC: 7.6 G/DL (ref 6–8.4)
RBC # BLD AUTO: 4.81 M/UL (ref 4–5.4)
SODIUM SERPL-SCNC: 138 MMOL/L (ref 136–145)
TRIGL SERPL-MCNC: 200 MG/DL (ref 30–150)
TSH SERPL DL<=0.005 MIU/L-ACNC: 2.73 UIU/ML (ref 0.4–4)
WBC # BLD AUTO: 9.67 K/UL (ref 3.9–12.7)

## 2022-04-06 PROCEDURE — 3008F BODY MASS INDEX DOCD: CPT | Mod: S$GLB,,, | Performed by: STUDENT IN AN ORGANIZED HEALTH CARE EDUCATION/TRAINING PROGRAM

## 2022-04-06 PROCEDURE — 3079F DIAST BP 80-89 MM HG: CPT | Mod: S$GLB,,, | Performed by: STUDENT IN AN ORGANIZED HEALTH CARE EDUCATION/TRAINING PROGRAM

## 2022-04-06 PROCEDURE — 1160F PR REVIEW ALL MEDS BY PRESCRIBER/CLIN PHARMACIST DOCUMENTED: ICD-10-PCS | Mod: S$GLB,,, | Performed by: STUDENT IN AN ORGANIZED HEALTH CARE EDUCATION/TRAINING PROGRAM

## 2022-04-06 PROCEDURE — 84443 ASSAY THYROID STIM HORMONE: CPT | Performed by: STUDENT IN AN ORGANIZED HEALTH CARE EDUCATION/TRAINING PROGRAM

## 2022-04-06 PROCEDURE — 99214 OFFICE O/P EST MOD 30 MIN: CPT | Mod: S$GLB,,, | Performed by: STUDENT IN AN ORGANIZED HEALTH CARE EDUCATION/TRAINING PROGRAM

## 2022-04-06 PROCEDURE — 1160F RVW MEDS BY RX/DR IN RCRD: CPT | Mod: S$GLB,,, | Performed by: STUDENT IN AN ORGANIZED HEALTH CARE EDUCATION/TRAINING PROGRAM

## 2022-04-06 PROCEDURE — 99999 PR PBB SHADOW E&M-EST. PATIENT-LVL V: CPT | Mod: PBBFAC,,, | Performed by: STUDENT IN AN ORGANIZED HEALTH CARE EDUCATION/TRAINING PROGRAM

## 2022-04-06 PROCEDURE — 82306 VITAMIN D 25 HYDROXY: CPT | Performed by: STUDENT IN AN ORGANIZED HEALTH CARE EDUCATION/TRAINING PROGRAM

## 2022-04-06 PROCEDURE — 3008F PR BODY MASS INDEX (BMI) DOCUMENTED: ICD-10-PCS | Mod: S$GLB,,, | Performed by: STUDENT IN AN ORGANIZED HEALTH CARE EDUCATION/TRAINING PROGRAM

## 2022-04-06 PROCEDURE — 85025 COMPLETE CBC W/AUTO DIFF WBC: CPT | Performed by: STUDENT IN AN ORGANIZED HEALTH CARE EDUCATION/TRAINING PROGRAM

## 2022-04-06 PROCEDURE — 3075F SYST BP GE 130 - 139MM HG: CPT | Mod: S$GLB,,, | Performed by: STUDENT IN AN ORGANIZED HEALTH CARE EDUCATION/TRAINING PROGRAM

## 2022-04-06 PROCEDURE — 99999 PR PBB SHADOW E&M-EST. PATIENT-LVL V: ICD-10-PCS | Mod: PBBFAC,,, | Performed by: STUDENT IN AN ORGANIZED HEALTH CARE EDUCATION/TRAINING PROGRAM

## 2022-04-06 PROCEDURE — 3079F PR MOST RECENT DIASTOLIC BLOOD PRESSURE 80-89 MM HG: ICD-10-PCS | Mod: S$GLB,,, | Performed by: STUDENT IN AN ORGANIZED HEALTH CARE EDUCATION/TRAINING PROGRAM

## 2022-04-06 PROCEDURE — 1159F MED LIST DOCD IN RCRD: CPT | Mod: S$GLB,,, | Performed by: STUDENT IN AN ORGANIZED HEALTH CARE EDUCATION/TRAINING PROGRAM

## 2022-04-06 PROCEDURE — 3075F PR MOST RECENT SYSTOLIC BLOOD PRESS GE 130-139MM HG: ICD-10-PCS | Mod: S$GLB,,, | Performed by: STUDENT IN AN ORGANIZED HEALTH CARE EDUCATION/TRAINING PROGRAM

## 2022-04-06 PROCEDURE — 1159F PR MEDICATION LIST DOCUMENTED IN MEDICAL RECORD: ICD-10-PCS | Mod: S$GLB,,, | Performed by: STUDENT IN AN ORGANIZED HEALTH CARE EDUCATION/TRAINING PROGRAM

## 2022-04-06 PROCEDURE — 99214 PR OFFICE/OUTPT VISIT, EST, LEVL IV, 30-39 MIN: ICD-10-PCS | Mod: S$GLB,,, | Performed by: STUDENT IN AN ORGANIZED HEALTH CARE EDUCATION/TRAINING PROGRAM

## 2022-04-06 PROCEDURE — 80053 COMPREHEN METABOLIC PANEL: CPT | Performed by: STUDENT IN AN ORGANIZED HEALTH CARE EDUCATION/TRAINING PROGRAM

## 2022-04-06 PROCEDURE — 80061 LIPID PANEL: CPT | Performed by: STUDENT IN AN ORGANIZED HEALTH CARE EDUCATION/TRAINING PROGRAM

## 2022-04-06 NOTE — TELEPHONE ENCOUNTER
----- Message from Nunu Costello MD sent at 4/6/2022  2:49 PM CDT -----  Please set her up for A1C.

## 2022-04-06 NOTE — PROGRESS NOTES
Subjective:       Patient ID: Candie Krause is a 46 y.o. female.    Chief Complaint: Hypertension, Follow-up (4 month ), Shortness of Breath, Palpitations, and Hot Flashes    Essential hypertension: Blood pressures have been well controlled at home.  BP Readings from Last 3 Encounters:  04/06/22 : 136/84  10/04/21 : (!) 142/98  05/03/21 : 121/76     Hypertension Medications     spironolactone (ALDACTONE) 50 MG tablet Take 1 tablet (50 mg total) by mouth once daily.       Patient denies headache, chest pain, palpitations, shortness of breath.     Hyperlipidemia: Patient's hyperlipidemia is well controlled. Taking diet controlled. Lab Results       Component                Value               Date                       CHOL                     175                 05/03/2021                 CHOL                     180                 10/17/2019                 CHOL                     162                 10/05/2018            Lab Results       Component                Value               Date                       HDL                      44                  05/03/2021                 HDL                      43                  10/17/2019                 HDL                      37 (L)              10/05/2018            Lab Results       Component                Value               Date                       LDLCALC                  98.8                05/03/2021                 LDLCALC                  106.6               10/17/2019                 LDLCALC                  61.2 (L)            10/05/2018            Lab Results       Component                Value               Date                       TRIG                     161 (H)             05/03/2021                 TRIG                     152 (H)             10/17/2019                 TRIG                     319 (H)             10/05/2018            Lab Results       Component                Value               Date                       CHOLHDL                   25.1                05/03/2021                 CHOLHDL                  23.9                10/17/2019                 CHOLHDL                  22.8                10/05/2018              The 10-year ASCVD risk score (Kathy BEASLEY Jr., et al., 2013) is: 1.8%    Values used to calculate the score:      Age: 46 years      Sex: Female      Is Non- : No      Diabetic: No      Tobacco smoker: No      Systolic Blood Pressure: 136 mmHg      Is BP treated: Yes      HDL Cholesterol: 44 mg/dL      Total Cholesterol: 175 mg/dL    Menopause with hot flashes and irregular periods    Obesity- she has had a schedule change from nights to days and has had a rough time with sleep pattern, eating habits, and activity.     Depression- well controlled on zoloft and doing well on the 50mg dose      4 weeks of increasing reynaga and palpitations with exertion. No chest pain. No pnd, no orthopnea.  Improves when she slows down.  She notes it when she is walking but not when other exertions        Review of Systems   Constitutional: Negative for activity change, appetite change, fatigue and unexpected weight change.   HENT: Negative for trouble swallowing.    Eyes: Negative for visual disturbance.   Respiratory: Positive for shortness of breath. Negative for chest tightness and wheezing.    Cardiovascular: Positive for palpitations. Negative for chest pain and leg swelling.   Gastrointestinal: Negative for abdominal pain, blood in stool, change in bowel habit and change in bowel habit.   Endocrine: Negative for cold intolerance, heat intolerance, polydipsia and polyuria.   Genitourinary: Positive for hot flashes. Negative for dysuria and hematuria.   Musculoskeletal: Negative for arthralgias, back pain and gait problem.   Integumentary:  Negative for rash and wound.   Neurological: Negative for dizziness, weakness and headaches.   Psychiatric/Behavioral: Negative for dysphoric mood and sleep disturbance.          Objective:      Physical Exam  Constitutional:       General: She is not in acute distress.     Appearance: Normal appearance. She is not ill-appearing.   Eyes:      Conjunctiva/sclera: Conjunctivae normal.   Neck:      Vascular: No carotid bruit.   Cardiovascular:      Rate and Rhythm: Normal rate and regular rhythm.      Pulses: Normal pulses.      Heart sounds: Normal heart sounds. No murmur heard.  Pulmonary:      Effort: Pulmonary effort is normal. No respiratory distress.      Breath sounds: Normal breath sounds. No wheezing.   Abdominal:      Palpations: Abdomen is soft.      Tenderness: There is no abdominal tenderness.   Musculoskeletal:      Right lower leg: No edema.      Left lower leg: No edema.        Feet:    Feet:      Right foot:      Toenail Condition: Right toenails are abnormally thick. Fungal disease present.     Left foot:      Toenail Condition: Left toenails are abnormally thick. Fungal disease present.  Lymphadenopathy:      Cervical: No cervical adenopathy.   Skin:     General: Skin is warm and dry.      Findings: No rash.   Neurological:      Mental Status: She is alert. Mental status is at baseline.      Gait: Gait normal.   Psychiatric:         Mood and Affect: Mood normal.         Behavior: Behavior normal.         Thought Content: Thought content normal.         Judgment: Judgment normal.         Assessment:       1. Essential hypertension    2. Dyslipidemia    3. Menopause    4. Morbid obesity with BMI of 50.0-59.9, adult    5. Current moderate episode of major depressive disorder without prior episode    6. Dyspnea on exertion    7. Palpitations    8. Screening for skin cancer    9. [place] or callus        Plan:       Problem List Items Addressed This Visit        Cardiac/Vascular    Dyslipidemia    Relevant Orders    Lipid Panel    Essential hypertension - Primary     Continue to monitor              Renal/    Menopause     monitor              Endocrine    Morbid obesity with BMI  of 50.0-59.9, adult       Other    Current moderate episode of major depressive disorder without prior episode     Continue zoloft 50             Other Visit Diagnoses     Dyspnea on exertion        Relevant Orders    NM Myocardial Perfusion Spect Multi Exer    Nuclear Stress Test    Palpitations        Relevant Orders    Holter monitor - 48 hour    CBC Auto Differential    Comprehensive Metabolic Panel    TSH    Screening for skin cancer        Relevant Orders    Ambulatory referral/consult to Dermatology    [place] or callus        Relevant Orders    Ambulatory referral/consult to Podiatry

## 2022-04-11 ENCOUNTER — PATIENT OUTREACH (OUTPATIENT)
Dept: ADMINISTRATIVE | Facility: OTHER | Age: 47
End: 2022-04-11
Payer: COMMERCIAL

## 2022-04-11 DIAGNOSIS — Z12.11 ENCOUNTER FOR FIT (FECAL IMMUNOCHEMICAL TEST) SCREENING: Primary | ICD-10-CM

## 2022-04-13 ENCOUNTER — PATIENT MESSAGE (OUTPATIENT)
Dept: FAMILY MEDICINE | Facility: CLINIC | Age: 47
End: 2022-04-13
Payer: COMMERCIAL

## 2022-04-13 RX ORDER — CHOLECALCIFEROL (VITAMIN D3) 25 MCG
5000 TABLET ORAL DAILY
COMMUNITY

## 2022-04-13 NOTE — TELEPHONE ENCOUNTER
Pt reports taking 5,000 units of Vitamin D a day.    Pt's past Rx states 50,000 units.     Please advise.

## 2022-04-19 NOTE — PATIENT INSTRUCTIONS
What is a Corn? What is a Callus?    Corns and calluses are areas of thickened skin that develop to protect that area from irritation. They occur when something rubs against the foot repeatedly or causes excess pressure against part of the foot. The term callus commonly is used if the thickening of skin occurs on the bottom of the foot, and if thickening occurs on the top of the foot (or toe), it's called a corn. However, the location of the thickened skin is less important than the pattern of thickening: flat, widespread skin thickening indicates a callus, and skin lesions that are thicker or deeper indicate a corn.    Corns and calluses are not contagious but may become painful if they get too thick. In people with diabetes or decreased circulation, they can lead to more serious foot problems.      Causes  Corns often occur where a toe rubs against the interior of a shoe. Excessive pressure at the balls of the feet--common in women who regularly wear high heels--may cause calluses to develop on the balls of the feet.    People with certain deformities of the foot, such as hammer toes, are prone to corns and calluses.      Symptoms  Corns and calluses typically have a rough, dull appearance. They may be raised or rounded, and they can be hard to differentiate from warts. Corns or calluses sometimes cause pain.      Home Care  Mild corns and calluses may not require treatment. If the corn or callus isn't bothering you, it can probably be left alone. It's a good idea, though, to investigate possible causes of the corn or callus. If your footwear is contributing to the development of a corn or callus, it's time to look for other shoes.    Over-the-counter treatments can do more harm than good, especially if you have any medical conditions such as diabetes. Some over-the-counter treatments contain harsh chemicals, which can lead to burns or even foot ulcers.       When to Visit a Podiatrist  If corns or calluses are  causing pain and discomfort or inhibiting your daily life in any way, see a podiatrist. Also, people with diabetes, poor circulation, or other serious illnesses should have their feet checked.      Diagnosis and Treatment  The podiatrist will conduct a complete examination of your feet. X-rays may be taken; your podiatrist may also want to inspect your shoes and watch you walk. He or she will also take a complete medical history. Corns and calluses are diagnosed based on appearance and history.    If you have mild corns or calluses, your podiatrist may suggest changing your shoes and/or adding padding to your shoes. Larger corns and calluses are most effectively reduced (made smaller) with a surgical blade. A podiatrist can use the blade to carefully shave away the thickened, dead skin--right in the office. The procedure is painless because the skin is already dead. Additional treatments may be needed if the corn or callus recurs.    Cortisone injections into the foot or toe may be given if the corn or callus is causing significant pain. Surgery may be necessary in cases that do not respond to conservative treatment.      Prevention  Wear properly fitted shoes. If you have any deformities of the toe or foot, talk to your podiatrist to find out what shoes are best for you.  Gel pad inserts may decrease friction points and pressure. Your podiatrist can help you determine where pads might be useful.        What is a Corn? What is a Callus?    Corns and calluses are areas of thickened skin that develop to protect that area from irritation. They occur when something rubs against the foot repeatedly or causes excess pressure against part of the foot. The term callus commonly is used if the thickening of skin occurs on the bottom of the foot, and if thickening occurs on the top of the foot (or toe), it's called a corn. However, the location of the thickened skin is less important than the pattern of thickening: flat,  widespread skin thickening indicates a callus, and skin lesions that are thicker or deeper indicate a corn.    Corns and calluses are not contagious but may become painful if they get too thick. In people with diabetes or decreased circulation, they can lead to more serious foot problems.      Causes  Corns often occur where a toe rubs against the interior of a shoe. Excessive pressure at the balls of the feet--common in women who regularly wear high heels--may cause calluses to develop on the balls of the feet.    People with certain deformities of the foot, such as hammer toes, are prone to corns and calluses.      Symptoms  Corns and calluses typically have a rough, dull appearance. They may be raised or rounded, and they can be hard to differentiate from warts. Corns or calluses sometimes cause pain.      Home Care  Mild corns and calluses may not require treatment. If the corn or callus isn't bothering you, it can probably be left alone. It's a good idea, though, to investigate possible causes of the corn or callus. If your footwear is contributing to the development of a corn or callus, it's time to look for other shoes.    Over-the-counter treatments can do more harm than good, especially if you have any medical conditions such as diabetes. Some over-the-counter treatments contain harsh chemicals, which can lead to burns or even foot ulcers.       When to Visit a Podiatrist  If corns or calluses are causing pain and discomfort or inhibiting your daily life in any way, see a podiatrist. Also, people with diabetes, poor circulation, or other serious illnesses should have their feet checked.      Diagnosis and Treatment  The podiatrist will conduct a complete examination of your feet. X-rays may be taken; your podiatrist may also want to inspect your shoes and watch you walk. He or she will also take a complete medical history. Corns and calluses are diagnosed based on appearance and history.    If you have mild  corns or calluses, your podiatrist may suggest changing your shoes and/or adding padding to your shoes. Larger corns and calluses are most effectively reduced (made smaller) with a surgical blade. A podiatrist can use the blade to carefully shave away the thickened, dead skin--right in the office. The procedure is painless because the skin is already dead. Additional treatments may be needed if the corn or callus recurs.    Cortisone injections into the foot or toe may be given if the corn or callus is causing significant pain. Surgery may be necessary in cases that do not respond to conservative treatment.      Prevention  Wear properly fitted shoes. If you have any deformities of the toe or foot, talk to your podiatrist to find out what shoes are best for you.  Gel pad inserts may decrease friction points and pressure. Your podiatrist can help you determine where pads might be useful.

## 2022-04-21 ENCOUNTER — OFFICE VISIT (OUTPATIENT)
Dept: PODIATRY | Facility: CLINIC | Age: 47
End: 2022-04-21
Payer: COMMERCIAL

## 2022-04-21 VITALS — BODY MASS INDEX: 43.4 KG/M2 | HEIGHT: 69 IN | WEIGHT: 293 LBS

## 2022-04-21 DIAGNOSIS — L60.2 OG (ONYCHOGRYPHOSIS): ICD-10-CM

## 2022-04-21 DIAGNOSIS — L84 CORN OR CALLUS: ICD-10-CM

## 2022-04-21 DIAGNOSIS — L60.2 ONYCHOGRYPHOSIS: ICD-10-CM

## 2022-04-21 DIAGNOSIS — B35.3 TINEA PEDIS OF BOTH FEET: ICD-10-CM

## 2022-04-21 DIAGNOSIS — D23.70 BENIGN NEOPLASM OF SKIN OF LOWER EXTREMITY, INCLUDING HIP, UNSPECIFIED LATERALITY: Primary | ICD-10-CM

## 2022-04-21 PROCEDURE — 3008F BODY MASS INDEX DOCD: CPT | Mod: S$GLB,,, | Performed by: PODIATRIST

## 2022-04-21 PROCEDURE — 17110 PR DESTRUCTION BENIGN LESIONS UP TO 14: ICD-10-PCS | Mod: S$GLB,,, | Performed by: PODIATRIST

## 2022-04-21 PROCEDURE — 1160F RVW MEDS BY RX/DR IN RCRD: CPT | Mod: S$GLB,,, | Performed by: PODIATRIST

## 2022-04-21 PROCEDURE — 99203 PR OFFICE/OUTPT VISIT, NEW, LEVL III, 30-44 MIN: ICD-10-PCS | Mod: 25,S$GLB,, | Performed by: PODIATRIST

## 2022-04-21 PROCEDURE — 1160F PR REVIEW ALL MEDS BY PRESCRIBER/CLIN PHARMACIST DOCUMENTED: ICD-10-PCS | Mod: S$GLB,,, | Performed by: PODIATRIST

## 2022-04-21 PROCEDURE — 1159F MED LIST DOCD IN RCRD: CPT | Mod: S$GLB,,, | Performed by: PODIATRIST

## 2022-04-21 PROCEDURE — 99999 PR PBB SHADOW E&M-EST. PATIENT-LVL III: ICD-10-PCS | Mod: PBBFAC,,, | Performed by: PODIATRIST

## 2022-04-21 PROCEDURE — 3008F PR BODY MASS INDEX (BMI) DOCUMENTED: ICD-10-PCS | Mod: S$GLB,,, | Performed by: PODIATRIST

## 2022-04-21 PROCEDURE — 17110 DESTRUCTION B9 LES UP TO 14: CPT | Mod: S$GLB,,, | Performed by: PODIATRIST

## 2022-04-21 PROCEDURE — 1159F PR MEDICATION LIST DOCUMENTED IN MEDICAL RECORD: ICD-10-PCS | Mod: S$GLB,,, | Performed by: PODIATRIST

## 2022-04-21 PROCEDURE — 99999 PR PBB SHADOW E&M-EST. PATIENT-LVL III: CPT | Mod: PBBFAC,,, | Performed by: PODIATRIST

## 2022-04-21 PROCEDURE — 99203 OFFICE O/P NEW LOW 30 MIN: CPT | Mod: 25,S$GLB,, | Performed by: PODIATRIST

## 2022-04-21 RX ORDER — CLOTRIMAZOLE AND BETAMETHASONE DIPROPIONATE 10; .64 MG/G; MG/G
CREAM TOPICAL 2 TIMES DAILY
Qty: 45 G | Refills: 3 | Status: SHIPPED | OUTPATIENT
Start: 2022-04-21 | End: 2022-05-21

## 2022-04-21 NOTE — PROGRESS NOTES
"  [address] [facility] [place] [address]  Phone: [phone]   [phone]    Patient's PCP:Nunu Costello MD  Referring Provider: Dr. Nunu Costello    Subjective:      Chief Complaint:: Tinea Pedis, Callouses, and Foot Pain    HPI  Candei Krause is a 46 y.o. female who presents today with a complaint of right 5th toe pain lasting for 6 months.   Current symptoms include pain with pressure and shoes. Aggravating factors are prolong standing, walking, tennis shoes that rub. Symptoms have progressed. Treatment to date have included corn pads, home trimming.    Also complains of dry, cracked skin lasting for 4 years. Symptoms include dry,cracked, itching. Aggravating factors include transitioning from socks to sandals. Treatment has included cerave lotion, Visiline intensive care.      Vitals:    22 1027   Weight: (!) 181 kg (399 lb)   Height: 5' 9" (1.753 m)   PainSc:   3      Shoe Size: 11    Past Surgical History:   Procedure Laterality Date    BREAST CYST EXCISION       SECTION, LOW TRANSVERSE      x 2    CHOLECYSTECTOMY      TUBAL LIGATION      WOUND DEBRIDEMENT      breast abscess x 3     Past Medical History:   Diagnosis Date    Abnormal Pap smear of cervix     Bell's palsy     x4 attacks    Blood glucose abnormal     Dyslipidemia     PCOS (polycystic ovarian syndrome)     Vitamin D deficiency disease      Family History   Problem Relation Age of Onset    Asthma Father     Heart failure Father     Heart attack Father     Coronary artery disease Father     Hypertension Mother     Hypertension Maternal Grandmother     Cancer Maternal Grandmother         Ovarian    Cancer Paternal Grandfather         lung    Rashes / Skin problems Son     Rashes / Skin problems Daughter     Breast cancer Paternal Aunt     Stroke Maternal Uncle     Glaucoma Neg Hx     Macular degeneration Neg Hx     Retinal detachment Neg Hx     Collagen disease Neg Hx     " Ovarian cancer Neg Hx     Colon cancer Neg Hx         Social History:   Marital Status:   Alcohol History:  reports current alcohol use.  Tobacco History:  reports that she has never smoked. She has never used smokeless tobacco.  Drug History:  reports no history of drug use.    Review of patient's allergies indicates:   Allergen Reactions    Codeine Nausea Only    Metformin Rash       Current Outpatient Medications   Medication Sig Dispense Refill    cetirizine (ZYRTEC) 10 MG tablet TAKE 1 TABLET BY MOUTH EVERY DAY 30 tablet 1    fluticasone propionate (FLONASE) 50 mcg/actuation nasal spray 1 SPRAY (50 MCG TOTAL) BY EACH NOSTRIL ROUTE ONCE DAILY. 16 mL 3    meloxicam (MOBIC) 15 MG tablet Take 1 tablet by mouth once daily.      sertraline (ZOLOFT) 50 MG tablet Take 1 tablet (50 mg total) by mouth once daily. 90 tablet 3    spironolactone (ALDACTONE) 50 MG tablet Take 1 tablet (50 mg total) by mouth once daily. 90 tablet 3    triamcinolone acetonide 0.5% (KENALOG) 0.5 % Crea Apply topically 2 (two) times daily. 454 g 0    vitamin D (VITAMIN D3) 1000 units Tab Take 5,000 Units by mouth once daily.       No current facility-administered medications for this visit.       Review of Systems   Constitutional: Negative for chills, fatigue, fever and unexpected weight change.   HENT: Negative for hearing loss and trouble swallowing.    Eyes: Negative for photophobia and visual disturbance.   Respiratory: Negative for cough, shortness of breath and wheezing.    Cardiovascular: Positive for leg swelling. Negative for chest pain and palpitations.   Gastrointestinal: Negative for abdominal pain and nausea.   Genitourinary: Negative for dysuria and frequency.   Musculoskeletal: Negative for arthralgias, back pain, gait problem, joint swelling and myalgias.   Skin: Negative for rash and wound.   Neurological: Positive for numbness. Negative for tremors, seizures, weakness and headaches.   Hematological: Does not  bruise/bleed easily.         Objective:        Physical Exam:   Foot Exam  Physical Exam  Ortho/SPM Exam   Constitutional: Patient is oriented to person, place, and time. Patient appears well-developed and well-nourished. No acute distress.      Psychiatric: Patient has a normal mood and affect. Patient's speech is normal and behavior is normal. Judgment is normal. Cognition and memory are normal.     Dry scale with superficial flakes without ulceration, drainage, pus, tracking, fluctuance, malodor, or cardinal signs infection.    Toenails 1st, 2nd, 3rd, 4th, 5th  bilateral are hypertrophic, dystrophic, discolored tanish brown with tan, gray crumbly subungual debris.  Tender to distal nail plate pressure, without periungual skin abnormality of each.    Focal benign hyperkeratotic skin lesion consisting entirely of hyperkeratotic tissue without open skin, drainage, pus, fluctuance, malodor, or signs of infection located right dorsal lateral 5th digit.. Tender to palpation.       Adequate joint range of motion without pain, limitation, nor crepitation Bilateral feet and ankle joints. Muscle strength is 5/5 in all groups bilaterally.     Protective sensation intact. Pain sensation intact bilateral feet and legs.    DP and PT pulses 2/4 bilateral, capillary refill 3 seconds all toes/distal feet, all toes/both feet warm to touch.   Negative lower extremity edema bilateral.    Imaging:            Assessment:       1. Benign neoplasm of skin of lower extremity, including hip, unspecified laterality    2. Corn or callus    3. OG (onychogryphosis)    4. Tinea pedis of both feet    5. Onychogryphosis      Plan:   Benign neoplasm of skin of lower extremity, including hip, unspecified laterality    [place] or callus  -     Ambulatory referral/consult to Podiatry    OG (onychogryphosis)    Tinea pedis of both feet    Onychogryphosis      No follow-ups on file.    Procedures          Trichloroacetic acid treatment of benign skin  lesions - right dorsal lateral 5th digit. . Informed consent was obtained, a time-out was called, hyperkeratotic skin was debrided from each lesion utilizing a scapel, Trichloroacetic acid  was applied, and was covered with a Band-Aid. Written and verbal instructions were given to the patient. Patient tolerated the procedure well.     Instructions After Trichloroacetic Acid Treatment    1. Keep dry for 3 days  2. If a blister forms, pop it  3. After 3rd day, begin wart stick twice daily for two week  4. Follow-up appointment 2 weeks      Wart Treatment: Twice Daily    1. Bathe area at night  2. File with pumice stone or nail file  3. Apply wart stick to affected area  4. Cover with a bad aid      Counseling:     I provided patient education verbally regarding:   Patient diagnosis, treatment options, as well as alternatives, risks, and benefits.     This note was created using Dragon voice recognition software that occasionally misinterpreted phrases or words.       Fungal infection of skin explained. Treatment options including no treatment, topical medications, oral medications were discussed, as well as success rates and risks of recurrence. We agreed on topical medication   Lotrisone prescribed   Prescribed [facility] antifungal foot cream and antifungal nail solution          Patient should call the office immediately if any signs of infection, such as fever, chills, sweats, increased redness or pain.    Patient was instructed to call the clinic or go to the emergency department if their symptoms do not improve, worsens, or if new symptoms develop.  Patient was advised that if any increased swelling, pain, or numbness arise to go immediately to the ED. Patient knows to call any time if an emergency arises. Shared decision making occurred and patient verbalized understanding in agreement with this plan.

## 2022-04-25 ENCOUNTER — OFFICE VISIT (OUTPATIENT)
Dept: DERMATOLOGY | Facility: CLINIC | Age: 47
End: 2022-04-25
Payer: COMMERCIAL

## 2022-04-25 DIAGNOSIS — D23.9 DERMATOFIBROMA: ICD-10-CM

## 2022-04-25 DIAGNOSIS — L73.8 SEBACEOUS HYPERPLASIA: ICD-10-CM

## 2022-04-25 DIAGNOSIS — D18.01 CHERRY ANGIOMA: ICD-10-CM

## 2022-04-25 DIAGNOSIS — D22.9 MULTIPLE BENIGN NEVI: Primary | ICD-10-CM

## 2022-04-25 DIAGNOSIS — L81.4 LENTIGO: ICD-10-CM

## 2022-04-25 DIAGNOSIS — L30.4 INTERTRIGO: ICD-10-CM

## 2022-04-25 DIAGNOSIS — B07.9 VERRUCA VULGARIS: ICD-10-CM

## 2022-04-25 PROCEDURE — 17110 PR DESTRUCTION BENIGN LESIONS UP TO 14: ICD-10-PCS | Mod: S$GLB,,, | Performed by: STUDENT IN AN ORGANIZED HEALTH CARE EDUCATION/TRAINING PROGRAM

## 2022-04-25 PROCEDURE — 99999 PR PBB SHADOW E&M-EST. PATIENT-LVL III: ICD-10-PCS | Mod: PBBFAC,,, | Performed by: STUDENT IN AN ORGANIZED HEALTH CARE EDUCATION/TRAINING PROGRAM

## 2022-04-25 PROCEDURE — 1159F PR MEDICATION LIST DOCUMENTED IN MEDICAL RECORD: ICD-10-PCS | Mod: CPTII,S$GLB,, | Performed by: STUDENT IN AN ORGANIZED HEALTH CARE EDUCATION/TRAINING PROGRAM

## 2022-04-25 PROCEDURE — 1160F RVW MEDS BY RX/DR IN RCRD: CPT | Mod: CPTII,S$GLB,, | Performed by: STUDENT IN AN ORGANIZED HEALTH CARE EDUCATION/TRAINING PROGRAM

## 2022-04-25 PROCEDURE — 1160F PR REVIEW ALL MEDS BY PRESCRIBER/CLIN PHARMACIST DOCUMENTED: ICD-10-PCS | Mod: CPTII,S$GLB,, | Performed by: STUDENT IN AN ORGANIZED HEALTH CARE EDUCATION/TRAINING PROGRAM

## 2022-04-25 PROCEDURE — 99999 PR PBB SHADOW E&M-EST. PATIENT-LVL III: CPT | Mod: PBBFAC,,, | Performed by: STUDENT IN AN ORGANIZED HEALTH CARE EDUCATION/TRAINING PROGRAM

## 2022-04-25 PROCEDURE — 99203 PR OFFICE/OUTPT VISIT, NEW, LEVL III, 30-44 MIN: ICD-10-PCS | Mod: 25,S$GLB,, | Performed by: STUDENT IN AN ORGANIZED HEALTH CARE EDUCATION/TRAINING PROGRAM

## 2022-04-25 PROCEDURE — 17110 DESTRUCTION B9 LES UP TO 14: CPT | Mod: S$GLB,,, | Performed by: STUDENT IN AN ORGANIZED HEALTH CARE EDUCATION/TRAINING PROGRAM

## 2022-04-25 PROCEDURE — 1159F MED LIST DOCD IN RCRD: CPT | Mod: CPTII,S$GLB,, | Performed by: STUDENT IN AN ORGANIZED HEALTH CARE EDUCATION/TRAINING PROGRAM

## 2022-04-25 PROCEDURE — 99203 OFFICE O/P NEW LOW 30 MIN: CPT | Mod: 25,S$GLB,, | Performed by: STUDENT IN AN ORGANIZED HEALTH CARE EDUCATION/TRAINING PROGRAM

## 2022-04-25 NOTE — PROGRESS NOTES
Subjective:       Patient ID:  Candie Krause is a 46 y.o. female who presents for   Chief Complaint   Patient presents with    Skin Check     TBSE     New pateint    Patient here today for skin check TBSE  Patient states she has a few spots of concern    C/o of spot on left knee x 3-4 months  She has tried OTC wart treatments, no improvement    Denies PHX of NMSC  Fhx: maternal grandmother had MM    States she was told by her endrocrinologist that she cannot use sunscreen because of her vitamin D levels.       Review of Systems   Constitutional: Negative for fever, chills and fatigue.   Respiratory: Negative for cough and shortness of breath.    Gastrointestinal: Negative for nausea and vomiting.   Skin: Negative for daily sunscreen use and activity-related sunscreen use.   Hematologic/Lymphatic: Bruises/bleeds easily.        Objective:    Physical Exam   Constitutional: She appears well-developed and well-nourished. No distress.   Neurological: She is alert and oriented to person, place, and time. She is not disoriented.   Psychiatric: She has a normal mood and affect.   Skin:   Areas Examined (abnormalities noted in diagram):   Scalp / Hair Palpated and Inspected  Head / Face Inspection Performed  Neck Inspection Performed  Chest / Axilla Inspection Performed  Abdomen Inspection Performed  Genitals / Buttocks / Groin Inspection Performed  Back Inspection Performed  RUE Inspected  LUE Inspection Performed  RLE Inspected  LLE Inspection Performed  Nails and Digits Inspection Performed                   Diagram Legend     Erythematous scaling macule/papule c/w actinic keratosis       Vascular papule c/w angioma      Pigmented verrucoid papule/plaque c/w seborrheic keratosis      Yellow umbilicated papule c/w sebaceous hyperplasia      Irregularly shaped tan macule c/w lentigo     1-2 mm smooth white papules consistent with Milia      Movable subcutaneous cyst with punctum c/w epidermal inclusion cyst       Subcutaneous movable cyst c/w pilar cyst      Firm pink to brown papule c/w dermatofibroma      Pedunculated fleshy papule(s) c/w skin tag(s)      Evenly pigmented macule c/w junctional nevus     Mildly variegated pigmented, slightly irregular-bordered macule c/w mildly atypical nevus      Flesh colored to evenly pigmented papule c/w intradermal nevus       Pink pearly papule/plaque c/w basal cell carcinoma      Erythematous hyperkeratotic cursted plaque c/w SCC      Surgical scar with no sign of skin cancer recurrence      Open and closed comedones      Inflammatory papules and pustules      Verrucoid papule consistent consistent with wart     Erythematous eczematous patches and plaques     Dystrophic onycholytic nail with subungual debris c/w onychomycosis     Umbilicated papule    Erythematous-base heme-crusted tan verrucoid plaque consistent with inflamed seborrheic keratosis     Erythematous Silvery Scaling Plaque c/w Psoriasis     See annotation      Assessment / Plan:        Multiple benign nevi  Careful dermoscopy evaluation of nevi performed with none identified as needing biopsy today  Monitor for new mole or moles that are becoming bigger, darker, irritated, or developing irregular borders.   Total body skin examination performed today including at least 12 points as noted in physical examination. No lesions suspicious for malignancy noted.  Patient instructed in importance in daily broad spectrum sun protection of at least spf 30. Mineral sunscreen ingredients preferred (Zinc +/- Titanium) and can be found OTC.   Patient encouraged to wear hat for all outdoor exposure.   Also discussed sun avoidance and use of protective clothing.    Sebaceous hyperplasia  -     Ambulatory referral/consult to Dermatology  This is a common condition representing benign enlargement of the sebaceous lobule. It typically occurs in adulthood. Reassurance given to patient.     Story angioma  This is a benign vascular lesion.  Reassurance given. No treatment required.     Intertrigo  - zeasorb AF powder  - lower abdomen  - keep area clean and dry    Lentigo  This is a benign hyperpigmented sun induced lesion. Daily sun protection will reduce the number of new lesions. Treatment of these benign lesions are considered cosmetic.    Dermatofibroma  This is a benign scar-like lesion secondary to minor trauma. No treatment required.     Verruca vulgaris  - left knee  Cryosurgery procedure note:    Verbal consent from the patient is obtained. Liquid nitrogen cryosurgery is applied to 1 verruca with prior paring, as detailed in the physical exam, to produce a freeze injury. 2 consecutive freeze thaw cycles are applied to each verruca. The patient is aware that blisters (possibly blood blisters) may form.             No follow-ups on file.

## 2022-05-02 ENCOUNTER — TELEPHONE (OUTPATIENT)
Dept: CARDIOLOGY | Facility: HOSPITAL | Age: 47
End: 2022-05-02

## 2022-05-03 ENCOUNTER — CLINICAL SUPPORT (OUTPATIENT)
Dept: CARDIOLOGY | Facility: HOSPITAL | Age: 47
End: 2022-05-03
Attending: STUDENT IN AN ORGANIZED HEALTH CARE EDUCATION/TRAINING PROGRAM
Payer: COMMERCIAL

## 2022-05-03 ENCOUNTER — HOSPITAL ENCOUNTER (OUTPATIENT)
Dept: RADIOLOGY | Facility: HOSPITAL | Age: 47
Discharge: HOME | End: 2022-05-03
Attending: STUDENT IN AN ORGANIZED HEALTH CARE EDUCATION/TRAINING PROGRAM
Payer: COMMERCIAL

## 2022-05-03 DIAGNOSIS — R06.09 DYSPNEA ON EXERTION: ICD-10-CM

## 2022-05-03 DIAGNOSIS — R00.2 PALPITATIONS: ICD-10-CM

## 2022-05-03 LAB
CV STRESS BASE HR: 75 BPM
DIASTOLIC BLOOD PRESSURE: 111 MMHG
OHS CV CPX 1 MINUTE RECOVERY HEART RATE: 91 BPM
OHS CV CPX 85 PERCENT MAX PREDICTED HEART RATE MALE: 139
OHS CV CPX MAX PREDICTED HEART RATE: 164
OHS CV CPX PATIENT IS FEMALE: 1
OHS CV CPX PATIENT IS MALE: 0
OHS CV CPX PEAK DIASTOLIC BLOOD PRESSURE: 96 MMHG
OHS CV CPX PEAK HEAR RATE: 95 BPM
OHS CV CPX PEAK RATE PRESSURE PRODUCT: NORMAL
OHS CV CPX PEAK SYSTOLIC BLOOD PRESSURE: 162 MMHG
OHS CV CPX PERCENT MAX PREDICTED HEART RATE ACHIEVED: 58
OHS CV CPX RATE PRESSURE PRODUCT PRESENTING: NORMAL
SYSTOLIC BLOOD PRESSURE: 167 MMHG

## 2022-05-03 PROCEDURE — 93016 CV STRESS TEST SUPVJ ONLY: CPT | Mod: ,,, | Performed by: INTERNAL MEDICINE

## 2022-05-03 PROCEDURE — 93018 NUCLEAR STRESS TEST (CUPID ONLY): ICD-10-PCS | Mod: ,,, | Performed by: INTERNAL MEDICINE

## 2022-05-03 PROCEDURE — 93017 CV STRESS TEST TRACING ONLY: CPT

## 2022-05-03 PROCEDURE — 93018 CV STRESS TEST I&R ONLY: CPT | Mod: ,,, | Performed by: INTERNAL MEDICINE

## 2022-05-03 PROCEDURE — A9502 TC99M TETROFOSMIN: HCPCS

## 2022-05-03 PROCEDURE — 93226 XTRNL ECG REC<48 HR SCAN A/R: CPT

## 2022-05-03 PROCEDURE — 93227 HOLTER MONITOR - 48 HOUR (CUPID ONLY): ICD-10-PCS | Mod: ,,, | Performed by: INTERNAL MEDICINE

## 2022-05-03 PROCEDURE — 63600175 PHARM REV CODE 636 W HCPCS: Performed by: STUDENT IN AN ORGANIZED HEALTH CARE EDUCATION/TRAINING PROGRAM

## 2022-05-03 PROCEDURE — 93016 NUCLEAR STRESS TEST (CUPID ONLY): ICD-10-PCS | Mod: ,,, | Performed by: INTERNAL MEDICINE

## 2022-05-03 PROCEDURE — 93227 XTRNL ECG REC<48 HR R&I: CPT | Mod: ,,, | Performed by: INTERNAL MEDICINE

## 2022-05-03 RX ORDER — REGADENOSON 0.08 MG/ML
0.4 INJECTION, SOLUTION INTRAVENOUS
Status: COMPLETED | OUTPATIENT
Start: 2022-05-03 | End: 2022-05-03

## 2022-05-03 RX ADMIN — REGADENOSON 0.4 MG: 0.08 INJECTION, SOLUTION INTRAVENOUS at 10:05

## 2022-05-04 ENCOUNTER — HOSPITAL ENCOUNTER (OUTPATIENT)
Dept: RADIOLOGY | Facility: HOSPITAL | Age: 47
Discharge: HOME | End: 2022-05-04
Attending: STUDENT IN AN ORGANIZED HEALTH CARE EDUCATION/TRAINING PROGRAM
Payer: COMMERCIAL

## 2022-05-04 DIAGNOSIS — R94.39 ABNORMAL STRESS TEST: Primary | ICD-10-CM

## 2022-05-04 DIAGNOSIS — R06.09 DYSPNEA ON EXERTION: ICD-10-CM

## 2022-05-04 DIAGNOSIS — R00.2 PALPITATIONS: ICD-10-CM

## 2022-05-10 ENCOUNTER — TELEPHONE (OUTPATIENT)
Dept: FAMILY MEDICINE | Facility: CLINIC | Age: 47
End: 2022-05-10
Payer: COMMERCIAL

## 2022-05-13 ENCOUNTER — LAB VISIT (OUTPATIENT)
Dept: LAB | Facility: CLINIC | Age: 47
End: 2022-05-13
Payer: COMMERCIAL

## 2022-05-13 ENCOUNTER — OFFICE VISIT (OUTPATIENT)
Dept: FAMILY MEDICINE | Facility: CLINIC | Age: 47
End: 2022-05-13
Payer: COMMERCIAL

## 2022-05-13 VITALS
BODY MASS INDEX: 43.4 KG/M2 | SYSTOLIC BLOOD PRESSURE: 132 MMHG | OXYGEN SATURATION: 96 % | HEART RATE: 70 BPM | DIASTOLIC BLOOD PRESSURE: 82 MMHG | HEIGHT: 69 IN | TEMPERATURE: 98 F | RESPIRATION RATE: 18 BRPM | WEIGHT: 293 LBS

## 2022-05-13 DIAGNOSIS — E66.01 MORBID OBESITY WITH BMI OF 50.0-59.9, ADULT: ICD-10-CM

## 2022-05-13 DIAGNOSIS — Z11.1 ENCOUNTER FOR TB TINE TEST: ICD-10-CM

## 2022-05-13 DIAGNOSIS — R94.39 ABNORMAL STRESS TEST: ICD-10-CM

## 2022-05-13 DIAGNOSIS — R73.9 HYPERGLYCEMIA: ICD-10-CM

## 2022-05-13 DIAGNOSIS — I10 ESSENTIAL HYPERTENSION: Primary | ICD-10-CM

## 2022-05-13 DIAGNOSIS — R00.2 PALPITATIONS: ICD-10-CM

## 2022-05-13 LAB
ESTIMATED AVG GLUCOSE: 128 MG/DL (ref 68–131)
HBA1C MFR BLD: 6.1 % (ref 4–5.6)

## 2022-05-13 PROCEDURE — 3008F PR BODY MASS INDEX (BMI) DOCUMENTED: ICD-10-PCS | Mod: S$GLB,,, | Performed by: FAMILY MEDICINE

## 2022-05-13 PROCEDURE — 99214 PR OFFICE/OUTPT VISIT, EST, LEVL IV, 30-39 MIN: ICD-10-PCS | Mod: S$GLB,,, | Performed by: FAMILY MEDICINE

## 2022-05-13 PROCEDURE — 1160F RVW MEDS BY RX/DR IN RCRD: CPT | Mod: S$GLB,,, | Performed by: FAMILY MEDICINE

## 2022-05-13 PROCEDURE — 99999 PR PBB SHADOW E&M-EST. PATIENT-LVL IV: CPT | Mod: PBBFAC,,, | Performed by: FAMILY MEDICINE

## 2022-05-13 PROCEDURE — 3008F BODY MASS INDEX DOCD: CPT | Mod: S$GLB,,, | Performed by: FAMILY MEDICINE

## 2022-05-13 PROCEDURE — 83036 HEMOGLOBIN GLYCOSYLATED A1C: CPT | Performed by: STUDENT IN AN ORGANIZED HEALTH CARE EDUCATION/TRAINING PROGRAM

## 2022-05-13 PROCEDURE — 1159F MED LIST DOCD IN RCRD: CPT | Mod: S$GLB,,, | Performed by: FAMILY MEDICINE

## 2022-05-13 PROCEDURE — 3075F SYST BP GE 130 - 139MM HG: CPT | Mod: S$GLB,,, | Performed by: FAMILY MEDICINE

## 2022-05-13 PROCEDURE — 3079F DIAST BP 80-89 MM HG: CPT | Mod: S$GLB,,, | Performed by: FAMILY MEDICINE

## 2022-05-13 PROCEDURE — 3075F PR MOST RECENT SYSTOLIC BLOOD PRESS GE 130-139MM HG: ICD-10-PCS | Mod: S$GLB,,, | Performed by: FAMILY MEDICINE

## 2022-05-13 PROCEDURE — 99999 PR PBB SHADOW E&M-EST. PATIENT-LVL IV: ICD-10-PCS | Mod: PBBFAC,,, | Performed by: FAMILY MEDICINE

## 2022-05-13 PROCEDURE — 99214 OFFICE O/P EST MOD 30 MIN: CPT | Mod: S$GLB,,, | Performed by: FAMILY MEDICINE

## 2022-05-13 PROCEDURE — 86480 TB TEST CELL IMMUN MEASURE: CPT | Performed by: FAMILY MEDICINE

## 2022-05-13 PROCEDURE — 1159F PR MEDICATION LIST DOCUMENTED IN MEDICAL RECORD: ICD-10-PCS | Mod: S$GLB,,, | Performed by: FAMILY MEDICINE

## 2022-05-13 PROCEDURE — 3079F PR MOST RECENT DIASTOLIC BLOOD PRESSURE 80-89 MM HG: ICD-10-PCS | Mod: S$GLB,,, | Performed by: FAMILY MEDICINE

## 2022-05-13 PROCEDURE — 1160F PR REVIEW ALL MEDS BY PRESCRIBER/CLIN PHARMACIST DOCUMENTED: ICD-10-PCS | Mod: S$GLB,,, | Performed by: FAMILY MEDICINE

## 2022-05-13 NOTE — PROGRESS NOTES
I have reviewed the notes, assessments, and/or procedures performed by Latosha Dumont NP, I concur with her/his documentation of Candie Krause.

## 2022-05-13 NOTE — PROGRESS NOTES
Subjective:       Patient ID: Candie Krause is a 46 y.o. female.    Chief Complaint: Follow-up and Non-stress Test    This patient is new to me.  Candie Krause presents to the clinic today for 4 week follow up after cardiac testing. Patient states she has not heard from cardiology office. Patient states she is doing well but still having palpitations and awaiting holter monitor results.   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   Patient educated on plan of care, verbalized understanding.     Review of Systems   Constitutional: Negative for activity change, appetite change, chills, diaphoresis and fever.   HENT: Negative for congestion, ear pain, postnasal drip, sinus pressure, sneezing and sore throat.    Eyes: Negative for pain, discharge, redness and itching.   Respiratory: Negative for apnea, cough, chest tightness, shortness of breath and wheezing.    Cardiovascular: Negative for chest pain and leg swelling.   Gastrointestinal: Negative for abdominal distention, abdominal pain, constipation, diarrhea, nausea and vomiting.   Genitourinary: Negative for difficulty urinating, dysuria, flank pain and frequency.   Skin: Negative for color change, rash and wound.   Neurological: Negative for dizziness.       Patient Active Problem List   Diagnosis    Vitamin D deficiency disease    Dyslipidemia    Blood glucose abnormal    Hirsutism    Morbid obesity with BMI of 50.0-59.9, adult    Irregular menses    Polycystic ovarian syndrome    Essential hypertension    Carpal tunnel syndrome of right wrist    Osteoarthritis of spine with radiculopathy, cervical region    Primary osteoarthritis of right knee    Current moderate episode of major depressive disorder without prior episode    Menopause       Objective:      Physical Exam  Vitals reviewed.   Constitutional:       General: She is not in acute distress.     Appearance:  "Normal appearance. She is well-developed.   HENT:      Head: Normocephalic.      Nose: Nose normal.   Eyes:      Conjunctiva/sclera: Conjunctivae normal.      Pupils: Pupils are equal, round, and reactive to light.   Cardiovascular:      Rate and Rhythm: Normal rate and regular rhythm.      Heart sounds: Normal heart sounds.   Pulmonary:      Effort: Pulmonary effort is normal. No respiratory distress.      Breath sounds: Normal breath sounds.   Musculoskeletal:      Cervical back: Normal range of motion and neck supple.   Skin:     General: Skin is warm and dry.      Findings: No rash.   Neurological:      Mental Status: She is alert and oriented to person, place, and time.   Psychiatric:         Mood and Affect: Mood normal.         Behavior: Behavior normal.         Lab Results   Component Value Date    WBC 9.67 04/06/2022    HGB 14.0 04/06/2022    HCT 44.0 04/06/2022     04/06/2022    CHOL 184 04/06/2022    TRIG 200 (H) 04/06/2022    HDL 36 (L) 04/06/2022    ALT 50 (H) 04/06/2022    AST 46 (H) 04/06/2022     04/06/2022    K 4.3 04/06/2022     04/06/2022    CREATININE 1.0 04/06/2022    BUN 15 04/06/2022    CO2 24 04/06/2022    TSH 2.726 04/06/2022    INR 0.9 06/15/2016    GLUF 92 10/17/2019    HGBA1C 5.6 05/03/2021     The 10-year ASCVD risk score (Kathy GALE Jr., et al., 2013) is: 2.4%    Values used to calculate the score:      Age: 46 years      Sex: Female      Is Non- : No      Diabetic: No      Tobacco smoker: No      Systolic Blood Pressure: 132 mmHg      Is BP treated: Yes      HDL Cholesterol: 36 mg/dL      Total Cholesterol: 184 mg/dL  Visit Vitals  /82 (BP Location: Right arm, Patient Position: Sitting, BP Method: X-Large (Manual))   Pulse 70   Temp 98.4 °F (36.9 °C) (Oral)   Resp 18   Ht 5' 9" (1.753 m)   Wt (!) 181.4 kg (399 lb 14.6 oz)   LMP 08/17/2017 (Exact Date)   SpO2 96%   BMI 59.06 kg/m²      Assessment:       1. Essential hypertension    2. " Encounter for TB torsten test    3. Morbid obesity with BMI of 50.0-59.9, adult    4. Abnormal stress test    5. Palpitations        Plan:       Candie was seen today for follow-up and non-stress test.    Diagnoses and all orders for this visit:    Essential hypertension / Abnormal stress test / Palpitations  Staff message sent to cardiology.  Continue medications as prescribed.  Follow up with PCP     Encounter for TB torsten test  -     Quantiferon Gold TB; Future    Morbid obesity with BMI of 50.0-59.9, adult  Body mass index is 59.06 kg/m².  Continue healthy diet and regular exercise as tolerated.  Continue medications as prescribed.  Follow up with PCP       Follow up in about 3 months (around 8/13/2022), or if symptoms worsen or fail to improve.      Future Appointments     Date Provider Specialty Appt Notes    8/8/2022 Nunu Costello MD Family Medicine 3 mo f/u

## 2022-05-16 ENCOUNTER — TELEPHONE (OUTPATIENT)
Dept: CARDIOLOGY | Facility: CLINIC | Age: 47
End: 2022-05-16

## 2022-05-16 ENCOUNTER — PATIENT MESSAGE (OUTPATIENT)
Dept: FAMILY MEDICINE | Facility: CLINIC | Age: 47
End: 2022-05-16
Payer: COMMERCIAL

## 2022-05-16 LAB
GAMMA INTERFERON BACKGROUND BLD IA-ACNC: 0.02 IU/ML
M TB IFN-G CD4+ BCKGRND COR BLD-ACNC: -0.01 IU/ML
MITOGEN IGNF BCKGRD COR BLD-ACNC: 9.98 IU/ML
OHS CV EVENT MONITOR DAY: 0
OHS CV HOLTER LENGTH DECIMAL HOURS: 48
OHS CV HOLTER LENGTH HOURS: 48
OHS CV HOLTER LENGTH MINUTES: 0
OHS CV HOLTER SINUS AVERAGE HR: 72
OHS CV HOLTER SINUS MAX HR: 124
OHS CV HOLTER SINUS MIN HR: 46
TB GOLD PLUS: NEGATIVE
TB2 - NIL: 0.01 IU/ML

## 2022-05-16 NOTE — TELEPHONE ENCOUNTER
----- Message from Andria Pate sent at 5/16/2022  3:55 PM CDT -----  Regarding: appt request  Contact: justine  Type:  Appointment Request      Name of Caller:  justine   When is the first available appointment?  none  Symptoms:  Abnormal stress test [R94.39]  Dyspnea on exertion [R06.00]  Palpitations [R00.2]    Best Call Back Number:  [phone]    Additional Information:  NP referral in [facility], asking for a call to schedule appt

## 2022-05-17 ENCOUNTER — PATIENT MESSAGE (OUTPATIENT)
Dept: FAMILY MEDICINE | Facility: CLINIC | Age: 47
End: 2022-05-17
Payer: COMMERCIAL

## 2022-05-19 ENCOUNTER — PATIENT OUTREACH (OUTPATIENT)
Dept: ADMINISTRATIVE | Facility: OTHER | Age: 47
End: 2022-05-19
Payer: COMMERCIAL

## 2022-05-20 ENCOUNTER — OFFICE VISIT (OUTPATIENT)
Dept: CARDIOLOGY | Facility: CLINIC | Age: 47
End: 2022-05-20
Payer: COMMERCIAL

## 2022-05-20 VITALS
RESPIRATION RATE: 17 BRPM | HEART RATE: 74 BPM | BODY MASS INDEX: 43.4 KG/M2 | DIASTOLIC BLOOD PRESSURE: 88 MMHG | WEIGHT: 293 LBS | OXYGEN SATURATION: 95 % | HEIGHT: 69 IN | SYSTOLIC BLOOD PRESSURE: 147 MMHG

## 2022-05-20 DIAGNOSIS — F43.9 STRESS: ICD-10-CM

## 2022-05-20 DIAGNOSIS — U07.1 COVID-19: ICD-10-CM

## 2022-05-20 DIAGNOSIS — R94.39 ABNORMAL STRESS TEST: Primary | ICD-10-CM

## 2022-05-20 DIAGNOSIS — M25.562 CHRONIC PAIN OF LEFT KNEE: ICD-10-CM

## 2022-05-20 DIAGNOSIS — E66.01 CLASS 3 SEVERE OBESITY DUE TO EXCESS CALORIES WITHOUT SERIOUS COMORBIDITY WITH BODY MASS INDEX (BMI) OF 50.0 TO 59.9 IN ADULT: ICD-10-CM

## 2022-05-20 DIAGNOSIS — Z91.89 CARDIOVASCULAR EVENT RISK: ICD-10-CM

## 2022-05-20 DIAGNOSIS — Z79.1 NSAID LONG-TERM USE: ICD-10-CM

## 2022-05-20 DIAGNOSIS — Z91.89 SEDENTARY LIFESTYLE: ICD-10-CM

## 2022-05-20 DIAGNOSIS — R00.2 PALPITATIONS: ICD-10-CM

## 2022-05-20 DIAGNOSIS — I10 ESSENTIAL HYPERTENSION: ICD-10-CM

## 2022-05-20 DIAGNOSIS — G89.29 CHRONIC PAIN OF LEFT KNEE: ICD-10-CM

## 2022-05-20 DIAGNOSIS — R73.09 ELEVATED HEMOGLOBIN A1C: ICD-10-CM

## 2022-05-20 DIAGNOSIS — R79.89 ELEVATED LFTS: ICD-10-CM

## 2022-05-20 DIAGNOSIS — R06.09 DYSPNEA ON EXERTION: ICD-10-CM

## 2022-05-20 DIAGNOSIS — E78.1 HYPERTRIGLYCERIDEMIA: ICD-10-CM

## 2022-05-20 PROCEDURE — 99204 PR OFFICE/OUTPT VISIT, NEW, LEVL IV, 45-59 MIN: ICD-10-PCS | Mod: 25,S$GLB,, | Performed by: INTERNAL MEDICINE

## 2022-05-20 PROCEDURE — 3077F PR MOST RECENT SYSTOLIC BLOOD PRESSURE >= 140 MM HG: ICD-10-PCS | Mod: S$GLB,,, | Performed by: INTERNAL MEDICINE

## 2022-05-20 PROCEDURE — 99999 PR PBB SHADOW E&M-EST. PATIENT-LVL IV: ICD-10-PCS | Mod: PBBFAC,,, | Performed by: INTERNAL MEDICINE

## 2022-05-20 PROCEDURE — 93000 ELECTROCARDIOGRAM COMPLETE: CPT | Mod: S$GLB,,, | Performed by: INTERNAL MEDICINE

## 2022-05-20 PROCEDURE — 99204 OFFICE O/P NEW MOD 45 MIN: CPT | Mod: 25,S$GLB,, | Performed by: INTERNAL MEDICINE

## 2022-05-20 PROCEDURE — 3008F BODY MASS INDEX DOCD: CPT | Mod: S$GLB,,, | Performed by: INTERNAL MEDICINE

## 2022-05-20 PROCEDURE — 3077F SYST BP >= 140 MM HG: CPT | Mod: S$GLB,,, | Performed by: INTERNAL MEDICINE

## 2022-05-20 PROCEDURE — 1159F PR MEDICATION LIST DOCUMENTED IN MEDICAL RECORD: ICD-10-PCS | Mod: S$GLB,,, | Performed by: INTERNAL MEDICINE

## 2022-05-20 PROCEDURE — 3079F DIAST BP 80-89 MM HG: CPT | Mod: S$GLB,,, | Performed by: INTERNAL MEDICINE

## 2022-05-20 PROCEDURE — 3044F HG A1C LEVEL LT 7.0%: CPT | Mod: S$GLB,,, | Performed by: INTERNAL MEDICINE

## 2022-05-20 PROCEDURE — 3044F PR MOST RECENT HEMOGLOBIN A1C LEVEL <7.0%: ICD-10-PCS | Mod: S$GLB,,, | Performed by: INTERNAL MEDICINE

## 2022-05-20 PROCEDURE — 1159F MED LIST DOCD IN RCRD: CPT | Mod: S$GLB,,, | Performed by: INTERNAL MEDICINE

## 2022-05-20 PROCEDURE — 3079F PR MOST RECENT DIASTOLIC BLOOD PRESSURE 80-89 MM HG: ICD-10-PCS | Mod: S$GLB,,, | Performed by: INTERNAL MEDICINE

## 2022-05-20 PROCEDURE — 93000 EKG 12-LEAD: ICD-10-PCS | Mod: S$GLB,,, | Performed by: INTERNAL MEDICINE

## 2022-05-20 PROCEDURE — 3008F PR BODY MASS INDEX (BMI) DOCUMENTED: ICD-10-PCS | Mod: S$GLB,,, | Performed by: INTERNAL MEDICINE

## 2022-05-20 PROCEDURE — 99999 PR PBB SHADOW E&M-EST. PATIENT-LVL IV: CPT | Mod: PBBFAC,,, | Performed by: INTERNAL MEDICINE

## 2022-05-20 NOTE — PROGRESS NOTES
Chart was reviewed for overdue [facility]active [facility] Encounters ([facility])  topics  Updates were requested from care everywhere  Health Maintenance has been updated  [place] immunization registry triggered  Mammogram previously ordered

## 2022-05-20 NOTE — PROGRESS NOTES
Subjective:    Patient ID:  Candie Krause is a 46 y.o. female who presents for evaluation of Palpitations and Shortness of Breath  PCP and referred by Nunu Costello MD, also for test results review.  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 Last full week vacation 7/2021.    Patient is a new patient to me.    Health literacy: medium   Vaccinations: up-to-date, completed COVID, infection 12/2021  Activities: most sedentary, walk at work and after 7 kids, no problem, not limited.  Nicotine: never  Alcohol: max 2 drinks in any 24 hours, once every 6 months.  Illicit drugs: none  Cardiac symptoms: palpitation with SOB for the past 3 months, likely stress related.  Home BP: do not check  Medication compliance: yes, no cardiac medication.  Diet: regular  Caffeine: 3 cpd, work night shifts.  Labs:   Lab Results   Component Value Date    TSH 2.726 04/06/2022        Lab Results   Component Value Date    HGBA1C 6.1 (H) 05/13/2022       Lab Results   Component Value Date    WBC 9.67 04/06/2022    HGB 14.0 04/06/2022    HCT 44.0 04/06/2022    MCV 92 04/06/2022     04/06/2022       CMP  Sodium   Date Value Ref Range Status   04/06/2022 138 136 - 145 mmol/L Final     Potassium   Date Value Ref Range Status   04/06/2022 4.3 3.5 - 5.1 mmol/L Final     Chloride   Date Value Ref Range Status   04/06/2022 102 95 - 110 mmol/L Final     CO2   Date Value Ref Range Status   04/06/2022 24 23 - 29 mmol/L Final     Glucose   Date Value Ref Range Status   04/06/2022 132 (H) 70 - 110 mg/dL Final     BUN   Date Value Ref Range Status   04/06/2022 15 6 - 20 mg/dL Final     Creatinine   Date Value Ref Range Status   04/06/2022 1.0 0.5 - 1.4 mg/dL Final     Calcium   Date Value Ref Range Status   04/06/2022 9.6 8.7 - 10.5 mg/dL Final     Total Protein   Date Value Ref Range Status   04/06/2022 7.6  6.0 - 8.4 g/dL Final     Albumin   Date Value Ref Range Status   04/06/2022 3.5 3.5 - 5.2 g/dL Final     Total Bilirubin   Date Value Ref Range Status   04/06/2022 0.5 0.1 - 1.0 mg/dL Final     Comment:     For infants and newborns, interpretation of results should be based  on gestational age, weight and in agreement with clinical  observations.    Premature Infant recommended reference ranges:  Up to 24 hours.............<8.0 mg/dL  Up to 48 hours............<12.0 mg/dL  3-5 days..................<15.0 mg/dL  6-29 days.................<15.0 mg/dL       Alkaline Phosphatase   Date Value Ref Range Status   04/06/2022 68 55 - 135 U/L Final     AST   Date Value Ref Range Status   04/06/2022 46 (H) 10 - 40 U/L Final     ALT   Date Value Ref Range Status   04/06/2022 50 (H) 10 - 44 U/L Final     Anion Gap   Date Value Ref Range Status   04/06/2022 12 8 - 16 mmol/L Final     eGFR if    Date Value Ref Range Status   04/06/2022 >60 >60 mL/min/1.73 m^2 Final     eGFR if non    Date Value Ref Range Status   04/06/2022 >60 >60 mL/min/1.73 m^2 Final     Comment:     Calculation used to obtain the estimated glomerular filtration  rate (eGFR) is the CKD-EPI equation.        @labrcntip(troponini)@  No results found for: BNP}   Lab Results   Component Value Date    CHOL 184 04/06/2022    CHOL 175 05/03/2021    CHOL 180 10/17/2019     Lab Results   Component Value Date    HDL 36 (L) 04/06/2022    HDL 44 05/03/2021    HDL 43 10/17/2019     Lab Results   Component Value Date    LDLCALC 108.0 04/06/2022    LDLCALC 98.8 05/03/2021    LDLCALC 106.6 10/17/2019     Lab Results   Component Value Date    TRIG 200 (H) 04/06/2022    TRIG 161 (H) 05/03/2021    TRIG 152 (H) 10/17/2019     Lab Results   Component Value Date    CHOLHDL 19.6 (L) 04/06/2022    CHOLHDL 25.1 05/03/2021    CHOLHDL 23.9 10/17/2019      Last Holter: 5/2022  Last Echo: none  Last stress test: Lexiscan 5/2022  Cardiovascular angiogram:  "none  ECG: NSR, rate 74, normal  Fundoscopic exam: within the past year, negative for retinopathy    WF referred for recent onset of palpitations with SOB. During rushing at work. More recent stress at home and at work. Negative past cardiac history. Have premature family history for CAD with father having first MI in his 50s.     Nunu Costello MD noted "Hypertension, Follow-up (4 month ), Shortness of Breath, Palpitations, and Hot Flashes   The 10-year ASCVD risk score (Kathy DC Jr., et al., 2013) is: 1.8%     Dyspnea on exertion         Relevant Orders     NM Myocardial Perfusion Spect Multi Exer     Nuclear Stress Test     Palpitations         Relevant Orders     Holter monitor - 48 hour     CBC Auto Differential     Comprehensive Metabolic Panel     TSH"    Holter 5/2022 - The diary was properly completed. The tape was adequate (0 days , 48 hours, 0 minutes). There was an episode of Palpitations reported. The corresponding rhythm strips revealed the following: During the event (moving), the rhythm was sinus tachycardia at 106 bpm with without ectopy. There was an episode of Palpitations reported. The corresponding rhythm strips revealed the following: During the event (working), the rhythm was sinus rhythm at 83 bpm with without ectopy. There was an episode of palpitation and SOB reported. The corresponding rhythm strips revealed the following: During the event (walking), the rhythm was sinus rhythm at 93 bpm with without ectopy. Rhythm  Heart rates varied between 46 and 124 BPM with an average of 72 BPM. Maximum heart rate recorded at: 09:12 CDT . Minimum heart rate recorded at 10:40 CDT on day 2.  Ventricular Arrhythmias There were very rare PVCs totalling 6 and averaging 0.13 per hour.  Supraventricular Arrhythmias There were very rare PACs totalling 14 and averaging 0.29 per hour.  No ST-T changes were noted during the recording    Lexiscan - Left ventriculomegaly with fixed defect in the inferior " apical region which may represent prior infarct versus diaphragmatic attenuation     No reversible defects to suggest stress-induced ischemia     Ejection fraction of 57%    Review of Systems   Constitutional: Negative for diaphoresis, fever, malaise/fatigue, night sweats and weight gain.   HENT: Positive for nosebleeds. Negative for tinnitus.    Eyes: Negative for visual disturbance.   Cardiovascular: Positive for dyspnea on exertion and palpitations. Negative for chest pain, claudication, cyanosis, irregular heartbeat, leg swelling, near-syncope, orthopnea and paroxysmal nocturnal dyspnea.   Respiratory: Positive for shortness of breath (only with palpitations.) and snoring. Negative for cough, sleep disturbances due to breathing and wheezing.         Harmony score 2, awaken refreshed.   Endocrine: Negative for polydipsia and polyuria.   Hematologic/Lymphatic: Does not bruise/bleed easily.   Skin: Negative for color change, flushing, nail changes, poor wound healing and suspicious lesions.   Musculoskeletal: Positive for back pain, joint swelling and muscle cramps. Negative for arthritis, falls, gout, joint pain, muscle weakness and myalgias.   Gastrointestinal: Negative for heartburn, hematemesis, hematochezia, melena and nausea.   Neurological: Negative for disturbances in coordination, excessive daytime sleepiness, dizziness, focal weakness, headaches, light-headedness, loss of balance, numbness, vertigo and weakness.   Psychiatric/Behavioral: Negative for depression and substance abuse. The patient does not have insomnia and is not nervous/anxious.    Allergic/Immunologic: Positive for environmental allergies.        Objective:    Physical Exam  Constitutional:       Appearance: She is well-developed.      Comments: RA O2 95%   HENT:      Head: Normocephalic.   Eyes:      Conjunctiva/sclera: Conjunctivae normal.      Pupils: Pupils are equal, round, and reactive to light.   Neck:      Thyroid: No thyromegaly.  "     Vascular: No JVD.      Comments: Circumference 19"  Cardiovascular:      Rate and Rhythm: Normal rate and regular rhythm.      Pulses: Intact distal pulses.           Carotid pulses are 1+ on the right side and 1+ on the left side.       Radial pulses are 1+ on the right side and 1+ on the left side.      Heart sounds: Heart sounds are distant. No murmur heard.    No friction rub. No gallop.   Pulmonary:      Effort: Pulmonary effort is normal.      Breath sounds: No rales.      Comments: Diminished breath sounds.  Chest:      Chest wall: No tenderness.   Abdominal:      General: Bowel sounds are normal.      Palpations: Abdomen is soft.      Tenderness: There is no abdominal tenderness.      Comments: Waist 59"   Musculoskeletal:         General: Normal range of motion.      Cervical back: Normal range of motion and neck supple.   Lymphadenopathy:      Cervical: No cervical adenopathy.   Skin:     General: Skin is warm and dry.      Findings: No rash.   Neurological:      Mental Status: She is alert and oriented to person, place, and time.           Assessment:       1. Abnormal stress test    2. Dyspnea on exertion    3. Palpitations, onset 2/2022    4. Essential hypertension    5. Stress    6. COVID-19, 12/2021    7. Sedentary lifestyle    8. Chronic pain of left knee    9. NSAID long-term use, Mobic daily for the past year.    10. Elevated hemoglobin A1c    11. Elevated LFTs    12. Hypertriglyceridemia    13. Cardiovascular event risk, ASCVD 10-year risk 1.8%, 2022    14. Class 3 severe obesity due to excess calories without serious comorbidity with body mass index (BMI) of 50.0 to 59.9 in adult         Plan:       Candie was seen today for palpitations and shortness of breath.    Diagnoses and all orders for this visit:    Abnormal stress test  -     Ambulatory referral/consult to Cardiology    Dyspnea on exertion  -     Ambulatory referral/consult to Cardiology    Palpitations, onset 2/2022  -     IN OFFICE " "EKG 12-LEAD (to Muse)    Essential hypertension    Stress    COVID-19, 12/2021    Sedentary lifestyle    Chronic pain of left knee    NSAID long-term use, Mobic daily for the past year.    Elevated hemoglobin A1c    Elevated LFTs  -     US Abdomen Limited; Future    Hypertriglyceridemia    Cardiovascular event risk, ASCVD 10-year risk 1.8%, 2022    Class 3 severe obesity due to excess calories without serious comorbidity with body mass index (BMI) of 50.0 to 59.9 in adult    - All medical issues reviewed, continue current Rx. Abnormal nuclear stress most likely due to soft tissue artifacts. Low ASCVD 10-year event risk, any testing is optional.  - Refer to "Chronic Pain" article in Consumer Report 6/2019 issue.  - Avoid use of NSAID if possible, Tylenol is safer. Can use up to 3000 mg per day, use early with onset of even mild pains.  - Teach the body to relax, try one of these for at least 30 minutes daily: meditation, deep prayer, gentle yoga, riley chi, paced breathing or visualizing yourself in a calm, safe place.  - Need a full week vacation ASAP, recommend at least 2 full-week vacation yearly for mental and physical health.  - CV status and all medications reviewed, patient acknowledge good understanding.  - Recommend healthy living: moderate alcohol, healthy diet and regular exercise aiming for fitness, restorative sleep and weight control  - Discussed healthy alcohol daily limit of 0.5 oz of pure alcohol in any 24 hours (roughly one 12-oz beers, 5 oz of wine (8%-12% alcohol), or 0.75 oz (half a shot) of liquor (80 proof)), can not save up.  - Need good exercise program, 4 key elements: 1. Aerobic (walking, swimming, dancing, jogging, biking, etc, 2. Muscle strengthening / resistance exercise, need to do 2-3 times weekly, 3. Stretching daily, good stretch takes a whole  total minute. 4. Balance exercise daily.   - Instruction for Mediterranean, low carb diet and heart healthy exercise given.  - Check home blood " pressure, 2 days weekly, do 2 readings within 5 minutes in AM and PM, keep log for review. Target resting BP is less than 130/85.  - Weigh twice weekly, try to lose 1-2 lbs per week. Target weight loss of 5%-10%.  - Highly recommend 30-60 minutes of exercise / activities daily, can have Sunday off, with 2-3 sessions of muscle strengthening weekly. A  would be very helpful.  - Low CV risk, will follow up only as needed.  - Phone review / encourage use of [facility]    Greater than 50% of the time was spent in counseling and coordination of care. The above assessment and plan have been discussed at length. Referring provider's note reviewed. Labs and procedure over the last 6 months reviewed. Problem List updated. Asked to bring in all active medications / pills bottles with next visit.

## 2022-05-20 NOTE — PATIENT INSTRUCTIONS
Recommended Mediterranean dietEating Heart-Healthy Food: Using the DASH Plan  Eating for your heart doesnt have to be hard or boring. You just need to know how to make healthier choices. The DASH eating plan has been developed to help you do just that. DASH stands for Dietary Approaches to Stop Hypertension. It is a plan that has been proven to be healthier for your heart and to lower your risk for high blood pressure. It can also help lower your risk for cancer, heart disease, osteoporosis, and diabetes.  Choosing from Each Food Group  Choose foods from each of the food groups below each day. Try to get the recommended number of servings for each food group. The serving numbers are based on a diet of 2,000 calories a day. Talk to your doctor if youre unsure about your calorie needs.  Grains   Servings: 7-8 a day  A serving is:  1 slice bread  1 ounce dry cereal  half a cup cooked rice or pasta  Best choices: Whole grains and any grains high in fiber.  Vegetables   Servings: 4-5 a day  A serving is:  1 cup raw leafy vegetable  Half a cup cooked vegetable  Three-quarter cup vegetable juice  Best choices: Fresh or frozen vegetable prepared without too much added salt or fat.    Fruits   Servings: 4-5 a day  A serving is:  Three-quarter cup fruit juice  1 medium fruit  One-quarter cup dried fruit  One-half cup fresh, frozen, or canned fruit  Best choices: A variety of fresh fruits of different colors. Whole fruits are a much better choice than fruit juices.  Low-fat or Fat Free Dairy   Servings: 2-3 a day  A serving is:  8 ounces milk  1 cup yogurt  One and a half ounces cheese  Best choices: Skim or 1% milk, low-fat or fat free yogurt or buttermilk, and low-fat cheeses.       Meat, Poultry, Fish   Servings: 2 or fewer a day  A serving is:  3 ounces cooked meat, poultry, or fish  Best choices: Lean meats and fish. Trim away visible fat. Broil, roast, or boil instead of frying. Remove skin from poultry before eating.   Nuts, Seeds, Beans   Servings: 4-5 a week  A serving is:  One third cup nuts (or one and a half ounces)  2 tablespoons sunflower seeds  Half a cup cooked beans  Best choices: Dry roasted nuts with no salt added, lentils, kidney beans, garbanzo beans, and whole moody beans.    Fats and Oils   Servings: 2 a day  A serving is:  1 teaspoon vegetable oil  1 teaspoon soft margarine  1 tablespoon low-fat mayonnaise  1 teaspoon regular mayonnaise  2 tablespoons light salad dressing  1 tablespoon regular salad dressing  Best choices: Monounsaturated and polyunsaturated fats such as olive, canola, or safflower oil.  Sweets   Servings: 5 a week or fewer  A serving is:  1 tablespoon sugar, maple syrup, or honey  1 tablespoon jam or jelly  1 half-ounce jelly beans (about 15)  8 ounces lemonade  Best choices: Dried fruit can be a satisfying sweet. Choose low-fat sweets when possible. And watch your serving sizes!       Aerobic Exercise for a Healthy Heart  Exercise is a lot more than an energy booster and a stress reliever. It also strengthens your heart muscle, lowers your blood pressure and blood cholesterol, and burns calories.      Remember, some activity is better than none.     Choose an Aerobic Activity  Choose a nonstop activity that makes your heart and lungs work harder than they do when you rest or walk normally. This aerobic exercise can improve the way your heart and other muscles use oxygen. Make it fun by exercising with a friend and choosing an activity you enjoy. Here are some ideas:  Walking  Swimming  Bicycling  Stair climbing  Dancing  Jogging  Exercise Regularly  If you havent been exercising regularly,  get your doctors okay first. Then start slowly.  Here are some tips:  Begin exercising 3 times a week for 5-10 minutes at a time.  When you feel comfortable, add a few minutes each week.  Slowly build up to exercising 3-4 times each week for 20-40 minutes. Aim for a total of 150 or more minutes a  week.  Be sure to carry your nitroglycerin with you when you exercise.  If you get angina when youre exercising, stop what youre doing, take your nitroglycerin, and call your doctor.  © 6691-0084 [employer], [address] [place] [address], [place] [address]. All rights reserved. This information is not intended as a substitute for professional medical care. Always follow your healthcare professional's instructions.    Losing Weight (Cardiovascular)  Excess weight is a major risk factor for heart disease. Losing weight may help keep your arteries open so that your heart can get the oxygen-rich blood it needs. Weight loss can also help lower your blood pressure and reduce your risk for diabetes. All in all, losing weight makes you healthier.          Exercise with a friend. When activity is fun, you're more likely to stick with it.        Calories and Weight Loss  Calories are the fuel your body burns for energy. You get the calories you need from the food you eat. For healthy weight loss, women should eat at least 1,200 calories a day, men at least 1,500.    When you eat more calories than you need, your body stores the extra calories as fat. One pound of fat equals 3,500 calories.    To lose weight, try to burn 500 calories a day more than you eat. To do this, eat 250 calories less each day. Add activity to burn the other 250 calories. Walking 21/2 miles burns about 250 calories.    Eat a variety of healthy foods. Its the best way to make calories count.     Tips for losing weight:  Drink 8 to 10 glasses of water a day.    Dont skip meals. Instead, eat smaller portions.       Brisk Activity Is Best  Brisk activity gets your heart pumping faster. It makes your heart healthier. Its also the best way to burn calories. In fact, your body may keep burning calories for hours after you stop a brisk activity.    Begin by walking 10 minutes most days.    Add more time and speed to your walk. Build up as you feel  able.    Try to walk briskly at least 30 minutes most days. If needed, you can break this into 2 shorter sessions.     Check off the ideas below that you could try to make your day more active:    Take the stairs instead of the elevator.    Park your car farther away and walk.    Ride a bike to work or to the store.    Walk laps around the mall.

## 2022-05-31 ENCOUNTER — PATIENT MESSAGE (OUTPATIENT)
Dept: ADMINISTRATIVE | Facility: HOSPITAL | Age: 47
End: 2022-05-31
Payer: COMMERCIAL

## 2022-07-06 DIAGNOSIS — Z12.31 OTHER SCREENING MAMMOGRAM: ICD-10-CM

## 2022-07-11 ENCOUNTER — PATIENT MESSAGE (OUTPATIENT)
Dept: ADMINISTRATIVE | Facility: HOSPITAL | Age: 47
End: 2022-07-11
Payer: COMMERCIAL

## 2022-07-18 ENCOUNTER — PATIENT MESSAGE (OUTPATIENT)
Dept: FAMILY MEDICINE | Facility: CLINIC | Age: 47
End: 2022-07-18
Payer: COMMERCIAL

## 2022-07-18 DIAGNOSIS — R73.03 PREDIABETES: Primary | ICD-10-CM

## 2022-08-09 ENCOUNTER — OCCUPATIONAL HEALTH (OUTPATIENT)
Dept: URGENT CARE | Facility: CLINIC | Age: 47
End: 2022-08-09

## 2022-08-09 DIAGNOSIS — Z11.1 SCREENING FOR TUBERCULOSIS: Primary | ICD-10-CM

## 2022-08-09 PROCEDURE — 80305 DRUG TEST PRSMV DIR OPT OBS: CPT | Mod: S$GLB,,, | Performed by: EMERGENCY MEDICINE

## 2022-08-09 PROCEDURE — 80305 PR COLLECTION ONLY DRUG SCREEN: ICD-10-PCS | Mod: S$GLB,,, | Performed by: EMERGENCY MEDICINE

## 2022-08-09 PROCEDURE — 99499 PHYSICAL - BASIC COMPLEXITY: ICD-10-PCS | Mod: S$GLB,,, | Performed by: EMERGENCY MEDICINE

## 2022-08-09 PROCEDURE — 86480 TB TEST CELL IMMUN MEASURE: CPT | Mod: S$GLB,,, | Performed by: EMERGENCY MEDICINE

## 2022-08-09 PROCEDURE — 86480 PR  TB TEST, CELL MEDIATED ANTIGEN RESPONSE,GAMMA INTERFRON: ICD-10-PCS | Mod: S$GLB,,, | Performed by: EMERGENCY MEDICINE

## 2022-08-09 PROCEDURE — 99499 UNLISTED E&M SERVICE: CPT | Mod: S$GLB,,, | Performed by: EMERGENCY MEDICINE

## 2022-08-12 LAB
GAMMA INTERFERON BACKGROUND BLD IA-ACNC: 0.01 IU/ML
M TB IFN-G BLD-IMP: NEGATIVE
M TB IFN-G CD4+ BCKGRND COR BLD-ACNC: 0.02 IU/ML
M TB IFN-G CD4+CD8+ BCKGRND COR BLD-ACNC: 0 IU/ML
MITOGEN IGNF BLD-ACNC: >10 IU/ML
QUANTIFERON TB GOLD (INCUBATED): NORMAL
SERVICE CMNT-IMP: NORMAL

## 2022-08-16 ENCOUNTER — TELEPHONE (OUTPATIENT)
Dept: FAMILY MEDICINE | Facility: CLINIC | Age: 47
End: 2022-08-16
Payer: COMMERCIAL

## 2022-08-16 NOTE — TELEPHONE ENCOUNTER
----- Message from Jessica Uriarte MA sent at 8/16/2022 10:05 AM CDT -----  Regarding: Prescription Prior Authorization Request  Received faxed document requesting PA completion for patient's Ozempic prescription.

## 2022-08-16 NOTE — TELEPHONE ENCOUNTER
"Was able to submit PA request which goes through "[employer]" for prescription coverage for the patient's [place] [employer] plan. Pending review by health plan currently.   "

## 2022-08-16 NOTE — TELEPHONE ENCOUNTER
"Received faxed document requesting PA for patient's ozempic prescription. Covermymeds KEY: [id number].     Submitted PA via covermymeds website along with last A1c and last OV note. Received response after submitting that "matching patient with eligibility could not be found".       Called patient to determine whether or not she has alternative coverage for her prescription medication coverage. No answer; left VM requesting phone call back Will also reach out to patient via [employer] message.   "

## 2022-08-16 NOTE — TELEPHONE ENCOUNTER
LVM for pt to return call to clinic to reschedule appointment due to PCP being out of clinic today.   Portal message sent as well

## 2022-08-24 ENCOUNTER — PATIENT MESSAGE (OUTPATIENT)
Dept: ADMINISTRATIVE | Facility: HOSPITAL | Age: 47
End: 2022-08-24
Payer: COMMERCIAL

## 2022-09-02 ENCOUNTER — TELEPHONE (OUTPATIENT)
Dept: FAMILY MEDICINE | Facility: CLINIC | Age: 47
End: 2022-09-02
Payer: COMMERCIAL

## 2022-09-04 ENCOUNTER — PATIENT MESSAGE (OUTPATIENT)
Dept: FAMILY MEDICINE | Facility: CLINIC | Age: 47
End: 2022-09-04
Payer: COMMERCIAL

## 2022-09-15 ENCOUNTER — PATIENT MESSAGE (OUTPATIENT)
Dept: ADMINISTRATIVE | Facility: HOSPITAL | Age: 47
End: 2022-09-15
Payer: COMMERCIAL

## 2022-09-28 ENCOUNTER — PATIENT MESSAGE (OUTPATIENT)
Dept: FAMILY MEDICINE | Facility: CLINIC | Age: 47
End: 2022-09-28
Payer: COMMERCIAL

## 2022-10-11 ENCOUNTER — PATIENT MESSAGE (OUTPATIENT)
Dept: ADMINISTRATIVE | Facility: HOSPITAL | Age: 47
End: 2022-10-11
Payer: COMMERCIAL

## 2022-10-31 ENCOUNTER — PATIENT MESSAGE (OUTPATIENT)
Dept: FAMILY MEDICINE | Facility: CLINIC | Age: 47
End: 2022-10-31
Payer: COMMERCIAL

## 2022-11-30 ENCOUNTER — PATIENT OUTREACH (OUTPATIENT)
Dept: ADMINISTRATIVE | Facility: HOSPITAL | Age: 47
End: 2022-11-30
Payer: COMMERCIAL

## 2022-11-30 NOTE — PROGRESS NOTES
Working bp report for Candido Ferguson, chart reviewed at this time.Population Health Outreach.Spoke with pt about bp follow up. She stated that she can do her bp at home but was currently at work. She stated that she will send some reading through her portal to us. She also confirmed that she will continue to use Dr Costello for her PCP needs even though the provider relocated to the  office. I informed her that she would need to be seen in office at least once a year, she confirmed understanding.

## 2023-01-17 ENCOUNTER — PATIENT MESSAGE (OUTPATIENT)
Dept: ADMINISTRATIVE | Facility: HOSPITAL | Age: 48
End: 2023-01-17
Payer: COMMERCIAL

## 2023-02-24 ENCOUNTER — PATIENT MESSAGE (OUTPATIENT)
Dept: ADMINISTRATIVE | Facility: HOSPITAL | Age: 48
End: 2023-02-24
Payer: COMMERCIAL

## 2023-03-20 ENCOUNTER — PATIENT OUTREACH (OUTPATIENT)
Dept: ADMINISTRATIVE | Facility: HOSPITAL | Age: 48
End: 2023-03-20
Payer: COMMERCIAL

## 2023-03-20 NOTE — PROGRESS NOTES
Population Health Outreach.Population Health chart review. Working a non-compliant Cervical Cancer Screening report.Attempted to reach pt by phone, no answer, left VM, sending portal/letter outreach at this time.Also sending portal about PCP Changes.

## 2023-04-11 ENCOUNTER — PATIENT MESSAGE (OUTPATIENT)
Dept: ADMINISTRATIVE | Facility: HOSPITAL | Age: 48
End: 2023-04-11
Payer: COMMERCIAL

## 2023-04-12 DIAGNOSIS — I10 ESSENTIAL HYPERTENSION: ICD-10-CM

## 2023-06-06 ENCOUNTER — PATIENT OUTREACH (OUTPATIENT)
Dept: ADMINISTRATIVE | Facility: HOSPITAL | Age: 48
End: 2023-06-06
Payer: COMMERCIAL

## 2023-06-06 ENCOUNTER — PATIENT MESSAGE (OUTPATIENT)
Dept: ADMINISTRATIVE | Facility: HOSPITAL | Age: 48
End: 2023-06-06
Payer: COMMERCIAL

## 2023-06-23 ENCOUNTER — PATIENT MESSAGE (OUTPATIENT)
Dept: FAMILY MEDICINE | Facility: CLINIC | Age: 48
End: 2023-06-23
Payer: COMMERCIAL

## 2023-06-27 ENCOUNTER — PATIENT MESSAGE (OUTPATIENT)
Dept: FAMILY MEDICINE | Facility: CLINIC | Age: 48
End: 2023-06-27
Payer: COMMERCIAL

## 2023-09-06 ENCOUNTER — PATIENT MESSAGE (OUTPATIENT)
Dept: FAMILY MEDICINE | Facility: CLINIC | Age: 48
End: 2023-09-06
Payer: COMMERCIAL

## 2023-09-07 ENCOUNTER — PATIENT MESSAGE (OUTPATIENT)
Dept: FAMILY MEDICINE | Facility: CLINIC | Age: 48
End: 2023-09-07
Payer: COMMERCIAL

## 2023-10-24 ENCOUNTER — PATIENT MESSAGE (OUTPATIENT)
Dept: FAMILY MEDICINE | Facility: CLINIC | Age: 48
End: 2023-10-24
Payer: COMMERCIAL

## 2023-11-06 ENCOUNTER — PATIENT MESSAGE (OUTPATIENT)
Dept: FAMILY MEDICINE | Facility: CLINIC | Age: 48
End: 2023-11-06
Payer: COMMERCIAL

## 2023-11-20 ENCOUNTER — OFFICE VISIT (OUTPATIENT)
Dept: PODIATRY | Facility: CLINIC | Age: 48
End: 2023-11-20
Payer: COMMERCIAL

## 2023-11-20 VITALS
HEART RATE: 96 BPM | SYSTOLIC BLOOD PRESSURE: 155 MMHG | HEIGHT: 69 IN | BODY MASS INDEX: 43.4 KG/M2 | DIASTOLIC BLOOD PRESSURE: 83 MMHG | WEIGHT: 293 LBS

## 2023-11-20 DIAGNOSIS — M19.079 OSTEOARTHRITIS OF ANKLE AND FOOT, UNSPECIFIED LATERALITY: ICD-10-CM

## 2023-11-20 DIAGNOSIS — M20.41 HAMMER TOE OF RIGHT FOOT: Primary | ICD-10-CM

## 2023-11-20 DIAGNOSIS — L97.511 ULCER OF RIGHT FOOT, LIMITED TO BREAKDOWN OF SKIN: ICD-10-CM

## 2023-11-20 DIAGNOSIS — B35.1 ONYCHOMYCOSIS DUE TO DERMATOPHYTE: ICD-10-CM

## 2023-11-20 PROCEDURE — 3077F SYST BP >= 140 MM HG: CPT | Mod: S$GLB,,, | Performed by: PODIATRIST

## 2023-11-20 PROCEDURE — 3008F BODY MASS INDEX DOCD: CPT | Mod: S$GLB,,, | Performed by: PODIATRIST

## 2023-11-20 PROCEDURE — 99999 PR PBB SHADOW E&M-EST. PATIENT-LVL III: CPT | Mod: PBBFAC,,, | Performed by: PODIATRIST

## 2023-11-20 PROCEDURE — 3079F PR MOST RECENT DIASTOLIC BLOOD PRESSURE 80-89 MM HG: ICD-10-PCS | Mod: S$GLB,,, | Performed by: PODIATRIST

## 2023-11-20 PROCEDURE — 1160F PR REVIEW ALL MEDS BY PRESCRIBER/CLIN PHARMACIST DOCUMENTED: ICD-10-PCS | Mod: S$GLB,,, | Performed by: PODIATRIST

## 2023-11-20 PROCEDURE — 1160F RVW MEDS BY RX/DR IN RCRD: CPT | Mod: S$GLB,,, | Performed by: PODIATRIST

## 2023-11-20 PROCEDURE — 99999 PR PBB SHADOW E&M-EST. PATIENT-LVL III: ICD-10-PCS | Mod: PBBFAC,,, | Performed by: PODIATRIST

## 2023-11-20 PROCEDURE — 99213 OFFICE O/P EST LOW 20 MIN: CPT | Mod: S$GLB,,, | Performed by: PODIATRIST

## 2023-11-20 PROCEDURE — 3077F PR MOST RECENT SYSTOLIC BLOOD PRESSURE >= 140 MM HG: ICD-10-PCS | Mod: S$GLB,,, | Performed by: PODIATRIST

## 2023-11-20 PROCEDURE — 3079F DIAST BP 80-89 MM HG: CPT | Mod: S$GLB,,, | Performed by: PODIATRIST

## 2023-11-20 PROCEDURE — 3008F PR BODY MASS INDEX (BMI) DOCUMENTED: ICD-10-PCS | Mod: S$GLB,,, | Performed by: PODIATRIST

## 2023-11-20 PROCEDURE — 1159F MED LIST DOCD IN RCRD: CPT | Mod: S$GLB,,, | Performed by: PODIATRIST

## 2023-11-20 PROCEDURE — 1159F PR MEDICATION LIST DOCUMENTED IN MEDICAL RECORD: ICD-10-PCS | Mod: S$GLB,,, | Performed by: PODIATRIST

## 2023-11-20 PROCEDURE — 99213 PR OFFICE/OUTPT VISIT, EST, LEVL III, 20-29 MIN: ICD-10-PCS | Mod: S$GLB,,, | Performed by: PODIATRIST

## 2023-11-20 RX ORDER — SEMAGLUTIDE 1.7 MG/.75ML
INJECTION, SOLUTION SUBCUTANEOUS
COMMUNITY
Start: 2023-11-07

## 2023-11-21 PROBLEM — B35.1 ONYCHOMYCOSIS DUE TO DERMATOPHYTE: Status: ACTIVE | Noted: 2023-11-21

## 2023-11-21 PROBLEM — M19.079 OSTEOARTHRITIS OF ANKLE AND FOOT: Status: ACTIVE | Noted: 2023-11-21

## 2023-11-21 PROBLEM — L97.511 ULCER OF RIGHT FOOT, LIMITED TO BREAKDOWN OF SKIN: Status: ACTIVE | Noted: 2023-11-21

## 2023-11-21 PROBLEM — M20.41 HAMMER TOE OF RIGHT FOOT: Status: ACTIVE | Noted: 2023-11-21

## 2023-11-21 NOTE — PROGRESS NOTES
Subjective:       Patient ID: Candie Krause is a 48 y.o. female.    Chief Complaint: Nail Problem (Both feet) and Callouses (Right foot)  Patient presents today for a new patient evaluation she is complaining about a painful area on the 5th toe of the right foot.  Patient relates this has been bothering her for about 6 months it has gotten progressively worse she states at 1 point the dermatologist told her to try to freeze the area but she states she actually thinks this made it worse.  Patient relates this has got to the point of being so painful that she can barely stand to wear shoe.  Patient indicated she is considered a prediabetic her most recent A1c was 6.1.    Past Medical History:   Diagnosis Date    Abnormal Pap smear of cervix     Bell's palsy     x4 attacks    Blood glucose abnormal     Dyslipidemia     PCOS (polycystic ovarian syndrome)     Vitamin D deficiency disease      Past Surgical History:   Procedure Laterality Date    BREAST CYST EXCISION       SECTION, LOW TRANSVERSE      x 2    CHOLECYSTECTOMY      TUBAL LIGATION      WOUND DEBRIDEMENT      breast abscess x 3         Social History     Socioeconomic History    Marital status:      Spouse name: Oskar Gore    Number of children: 2   Occupational History     Employer: [employer]   Tobacco Use    Smoking status: Never    Smokeless tobacco: Never   Substance and Sexual Activity    Alcohol use: Yes     Comment: 2 glasses of wine a month    Drug use: No    Sexual activity: Yes     Partners: Male     Birth control/protection: Surgical       Current Outpatient Medications   Medication Sig Dispense Refill    cetirizine (ZYRTEC) 10 MG tablet TAKE 1 TABLET BY MOUTH EVERY DAY 90 tablet 1    fluticasone propionate (FLONASE) 50 mcg/actuation nasal spray 1 SPRAY (50 MCG TOTAL) BY EACH NOSTRIL ROUTE ONCE DAILY. 16 mL 3    meloxicam (MOBIC) 15 MG tablet Take 1 tablet by mouth once daily.      spironolactone (ALDACTONE) 50  "MG tablet TAKE 1 TABLET BY MOUTH EVERY DAY 90 tablet 0    vitamin D (VITAMIN D3) 1000 units Tab Take 5,000 Units by mouth once daily.      WEGOVY 1.7 mg/0.75 mL PnIj SMARTSI.7 Milligram(s) SUB-Q Once a Week       No current facility-administered medications for this visit.     Review of patient's allergies indicates:   Allergen Reactions    Codeine Nausea Only    Metformin Rash       Review of Systems   Musculoskeletal:  Positive for arthralgias and joint swelling.   Skin:  Positive for wound.   All other systems reviewed and are negative.      Objective:      Vitals:    23 1631   BP: (!) 155/83   BP Location: Left arm   Patient Position: Sitting   Pulse: 96   Weight: (!) 180.5 kg (398 lb)   Height: 5' 9" (1.753 m)     Physical Exam  Vitals and nursing note reviewed.   Constitutional:       Appearance: Normal appearance.   Cardiovascular:      Pulses:           Dorsalis pedis pulses are 1+ on the right side and 1+ on the left side.        Posterior tibial pulses are 1+ on the right side and 1+ on the left side.   Pulmonary:      Effort: Pulmonary effort is normal.   Musculoskeletal:         General: Swelling, tenderness and deformity present.      Right foot: Decreased range of motion. Deformity present.        Feet:    Feet:      Right foot:      Protective Sensation: 2 sites tested.  2 sites sensed.      Skin integrity: Ulcer, skin breakdown, erythema and callus present.      Toenail Condition: Fungal disease present.     Left foot:      Protective Sensation: 2 sites tested.  2 sites sensed.      Toenail Condition: Fungal disease present.  Skin:     Capillary Refill: Capillary refill takes 2 to 3 seconds.      Findings: Erythema present.   Neurological:      General: No focal deficit present.      Mental Status: She is alert.   Psychiatric:         Mood and Affect: Mood normal.         Behavior: Behavior normal.                            Assessment:       1. Hammer toe of right foot    2. Ulcer of right " foot, limited to breakdown of skin    3. Onychomycosis due to dermatophyte    4. Osteoarthritis of ankle and foot, unspecified laterality        Plan:       Patient presents today for a new patient evaluation she is complaining about a painful area on the 5th toe of the right foot.  Patient relates this has been bothering her for about 6 months it has gotten progressively worse she states at 1 point the dermatologist told her to try to freeze the area but she states she actually thinks this made it worse.  Patient relates this has got to the point of being so painful that she can barely stand to wear shoe.  Patient indicated she is considered a prediabetic her most recent A1c was 6.1.  A comprehensive new patient evaluation was performed today patient does have a pre ulcerative area of hyperkeratosis overlying the lateral aspect of the 5th digit right this is secondary to digital contracture.  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  Patient does have palpable spurring 5th digit bilateral I did explain to the patient how a bone spur can cause rubbing and irritation and subsequent callus corn formation.  I did non excisionally debride the hyperkeratotic lesion which should give the patient considerable relief she needs to keep her skin well moisturized well hydrated both of her feet were very dry cracking flaking I have recommended the application of a 40% urea cream daily this includes the area where she is got the corn over the 5th digit I have also dispensed the patient Silipos pads and toe sleeves to paced placed over the 5th digit to prevent the rubbing and irritation.  I have recommended the use of Vicks vapor rub twice a day every day advising the patient this has been shown to control and eliminate fungal infection in  the toenails however can take 4-6 months and has to be applied twice a day.  Patient dispensed blue arch pads to put in her work shoes she indicates that she has always had work shoes with good support she recently got some new ones even the same brand and they were not as supportive and actually rub the outside of the 5th digit more.  Patient advised if she is not showing signs of improvement or if this starts to bother her further or does not resolve to contact us for follow-up I would certainly recommend exhausting all conservative measures 1st.  X-rays were not necessary at this point unless we are considering surgical intervention or unless the patient is not responding to conservative treatment.  Follow-up as needed.This note was created using [employer] voice recognition software that occasionally misinterpreted phrases or words.

## 2023-12-18 ENCOUNTER — PATIENT MESSAGE (OUTPATIENT)
Dept: FAMILY MEDICINE | Facility: CLINIC | Age: 48
End: 2023-12-18
Payer: COMMERCIAL